# Patient Record
Sex: MALE | Race: WHITE | NOT HISPANIC OR LATINO | URBAN - METROPOLITAN AREA
[De-identification: names, ages, dates, MRNs, and addresses within clinical notes are randomized per-mention and may not be internally consistent; named-entity substitution may affect disease eponyms.]

---

## 2022-06-17 ENCOUNTER — EMERGENCY (EMERGENCY)
Facility: HOSPITAL | Age: 62
LOS: 1 days | Discharge: ANOTHER HOSPITAL | End: 2022-06-17
Attending: STUDENT IN AN ORGANIZED HEALTH CARE EDUCATION/TRAINING PROGRAM | Admitting: STUDENT IN AN ORGANIZED HEALTH CARE EDUCATION/TRAINING PROGRAM
Payer: MEDICAID

## 2022-06-17 ENCOUNTER — INPATIENT (INPATIENT)
Facility: HOSPITAL | Age: 62
LOS: 1 days | Discharge: HOME | DRG: 247 | End: 2022-06-19
Attending: INTERNAL MEDICINE | Admitting: INTERNAL MEDICINE
Payer: MEDICAID

## 2022-06-17 VITALS
HEART RATE: 82 BPM | TEMPERATURE: 98 F | OXYGEN SATURATION: 98 % | RESPIRATION RATE: 18 BRPM | DIASTOLIC BLOOD PRESSURE: 82 MMHG | SYSTOLIC BLOOD PRESSURE: 182 MMHG

## 2022-06-17 VITALS
OXYGEN SATURATION: 100 % | DIASTOLIC BLOOD PRESSURE: 88 MMHG | RESPIRATION RATE: 20 BRPM | SYSTOLIC BLOOD PRESSURE: 155 MMHG | TEMPERATURE: 99 F | HEART RATE: 88 BPM

## 2022-06-17 VITALS
OXYGEN SATURATION: 98 % | WEIGHT: 292.99 LBS | TEMPERATURE: 97 F | DIASTOLIC BLOOD PRESSURE: 82 MMHG | HEART RATE: 67 BPM | SYSTOLIC BLOOD PRESSURE: 154 MMHG | RESPIRATION RATE: 18 BRPM

## 2022-06-17 DIAGNOSIS — I21.3 ST ELEVATION (STEMI) MYOCARDIAL INFARCTION OF UNSPECIFIED SITE: ICD-10-CM

## 2022-06-17 LAB
ALBUMIN SERPL ELPH-MCNC: 4.6 G/DL — SIGNIFICANT CHANGE UP (ref 3.3–5)
ALP SERPL-CCNC: 58 U/L — SIGNIFICANT CHANGE UP (ref 40–120)
ALT FLD-CCNC: 36 U/L — SIGNIFICANT CHANGE UP (ref 12–78)
ANION GAP SERPL CALC-SCNC: 8 MMOL/L — SIGNIFICANT CHANGE UP (ref 5–17)
APTT BLD: 24.7 SEC — LOW (ref 27.5–35.5)
AST SERPL-CCNC: 28 U/L — SIGNIFICANT CHANGE UP (ref 15–37)
BASOPHILS # BLD AUTO: 0.05 K/UL — SIGNIFICANT CHANGE UP (ref 0–0.2)
BASOPHILS NFR BLD AUTO: 0.5 % — SIGNIFICANT CHANGE UP (ref 0–2)
BILIRUB SERPL-MCNC: 0.7 MG/DL — SIGNIFICANT CHANGE UP (ref 0.2–1.2)
BUN SERPL-MCNC: 30 MG/DL — HIGH (ref 7–23)
CALCIUM SERPL-MCNC: 10.6 MG/DL — HIGH (ref 8.5–10.1)
CHLORIDE SERPL-SCNC: 103 MMOL/L — SIGNIFICANT CHANGE UP (ref 96–108)
CK MB CFR SERPL CALC: 2.9 NG/ML — SIGNIFICANT CHANGE UP (ref 0–3.6)
CO2 SERPL-SCNC: 26 MMOL/L — SIGNIFICANT CHANGE UP (ref 22–31)
CREAT SERPL-MCNC: 1.9 MG/DL — HIGH (ref 0.5–1.3)
EGFR: 39 ML/MIN/1.73M2 — LOW
EOSINOPHIL # BLD AUTO: 0.02 K/UL — SIGNIFICANT CHANGE UP (ref 0–0.5)
EOSINOPHIL NFR BLD AUTO: 0.2 % — SIGNIFICANT CHANGE UP (ref 0–6)
GLUCOSE BLDC GLUCOMTR-MCNC: 116 MG/DL — HIGH (ref 70–99)
GLUCOSE BLDC GLUCOMTR-MCNC: 130 MG/DL — HIGH (ref 70–99)
GLUCOSE SERPL-MCNC: 133 MG/DL — HIGH (ref 70–99)
HCT VFR BLD CALC: 44.4 % — SIGNIFICANT CHANGE UP (ref 39–50)
HGB BLD-MCNC: 14.7 G/DL — SIGNIFICANT CHANGE UP (ref 13–17)
IMM GRANULOCYTES NFR BLD AUTO: 0.6 % — SIGNIFICANT CHANGE UP (ref 0–1.5)
INR BLD: 1.09 RATIO — SIGNIFICANT CHANGE UP (ref 0.88–1.16)
LYMPHOCYTES # BLD AUTO: 1.17 K/UL — SIGNIFICANT CHANGE UP (ref 1–3.3)
LYMPHOCYTES # BLD AUTO: 11.1 % — LOW (ref 13–44)
MCHC RBC-ENTMCNC: 29.1 PG — SIGNIFICANT CHANGE UP (ref 27–34)
MCHC RBC-ENTMCNC: 33.1 GM/DL — SIGNIFICANT CHANGE UP (ref 32–36)
MCV RBC AUTO: 87.9 FL — SIGNIFICANT CHANGE UP (ref 80–100)
MONOCYTES # BLD AUTO: 0.63 K/UL — SIGNIFICANT CHANGE UP (ref 0–0.9)
MONOCYTES NFR BLD AUTO: 6 % — SIGNIFICANT CHANGE UP (ref 2–14)
NEUTROPHILS # BLD AUTO: 8.64 K/UL — HIGH (ref 1.8–7.4)
NEUTROPHILS NFR BLD AUTO: 81.6 % — HIGH (ref 43–77)
NRBC # BLD: 0 /100 WBCS — SIGNIFICANT CHANGE UP (ref 0–0)
PLATELET # BLD AUTO: 266 K/UL — SIGNIFICANT CHANGE UP (ref 150–400)
POTASSIUM SERPL-MCNC: 5 MMOL/L — SIGNIFICANT CHANGE UP (ref 3.5–5.3)
POTASSIUM SERPL-SCNC: 5 MMOL/L — SIGNIFICANT CHANGE UP (ref 3.5–5.3)
PROT SERPL-MCNC: 8.5 G/DL — HIGH (ref 6–8.3)
PROTHROM AB SERPL-ACNC: 12.8 SEC — SIGNIFICANT CHANGE UP (ref 10.5–13.4)
RBC # BLD: 5.05 M/UL — SIGNIFICANT CHANGE UP (ref 4.2–5.8)
RBC # FLD: 12.9 % — SIGNIFICANT CHANGE UP (ref 10.3–14.5)
SARS-COV-2 RNA SPEC QL NAA+PROBE: SIGNIFICANT CHANGE UP
SODIUM SERPL-SCNC: 137 MMOL/L — SIGNIFICANT CHANGE UP (ref 135–145)
TROPONIN I, HIGH SENSITIVITY RESULT: 40 NG/L — SIGNIFICANT CHANGE UP
WBC # BLD: 10.57 K/UL — HIGH (ref 3.8–10.5)
WBC # FLD AUTO: 10.57 K/UL — HIGH (ref 3.8–10.5)

## 2022-06-17 PROCEDURE — 71045 X-RAY EXAM CHEST 1 VIEW: CPT

## 2022-06-17 PROCEDURE — 36415 COLL VENOUS BLD VENIPUNCTURE: CPT

## 2022-06-17 PROCEDURE — 92978 ENDOLUMINL IVUS OCT C 1ST: CPT | Mod: 26,LD

## 2022-06-17 PROCEDURE — 84484 ASSAY OF TROPONIN QUANT: CPT

## 2022-06-17 PROCEDURE — 92941 PRQ TRLML REVSC TOT OCCL AMI: CPT | Mod: 59,LD

## 2022-06-17 PROCEDURE — 87635 SARS-COV-2 COVID-19 AMP PRB: CPT

## 2022-06-17 PROCEDURE — 85610 PROTHROMBIN TIME: CPT

## 2022-06-17 PROCEDURE — 99291 CRITICAL CARE FIRST HOUR: CPT

## 2022-06-17 PROCEDURE — 99291 CRITICAL CARE FIRST HOUR: CPT | Mod: 25

## 2022-06-17 PROCEDURE — 93010 ELECTROCARDIOGRAM REPORT: CPT

## 2022-06-17 PROCEDURE — 99152 MOD SED SAME PHYS/QHP 5/>YRS: CPT | Mod: 59

## 2022-06-17 PROCEDURE — 82553 CREATINE MB FRACTION: CPT

## 2022-06-17 PROCEDURE — 93306 TTE W/DOPPLER COMPLETE: CPT | Mod: 26

## 2022-06-17 PROCEDURE — 85730 THROMBOPLASTIN TIME PARTIAL: CPT

## 2022-06-17 PROCEDURE — 85025 COMPLETE CBC W/AUTO DIFF WBC: CPT

## 2022-06-17 PROCEDURE — 80053 COMPREHEN METABOLIC PANEL: CPT

## 2022-06-17 PROCEDURE — 93458 L HRT ARTERY/VENTRICLE ANGIO: CPT | Mod: 26,59

## 2022-06-17 PROCEDURE — 99285 EMERGENCY DEPT VISIT HI MDM: CPT | Mod: 25

## 2022-06-17 PROCEDURE — 93005 ELECTROCARDIOGRAM TRACING: CPT

## 2022-06-17 PROCEDURE — 99223 1ST HOSP IP/OBS HIGH 75: CPT

## 2022-06-17 PROCEDURE — 71045 X-RAY EXAM CHEST 1 VIEW: CPT | Mod: 26

## 2022-06-17 RX ORDER — METOPROLOL TARTRATE 50 MG
25 TABLET ORAL
Refills: 0 | Status: DISCONTINUED | OUTPATIENT
Start: 2022-06-17 | End: 2022-06-17

## 2022-06-17 RX ORDER — HEPARIN SODIUM 5000 [USP'U]/ML
5000 INJECTION INTRAVENOUS; SUBCUTANEOUS EVERY 8 HOURS
Refills: 0 | Status: DISCONTINUED | OUTPATIENT
Start: 2022-06-18 | End: 2022-06-19

## 2022-06-17 RX ORDER — TICAGRELOR 90 MG/1
180 TABLET ORAL ONCE
Refills: 0 | Status: COMPLETED | OUTPATIENT
Start: 2022-06-17 | End: 2022-06-17

## 2022-06-17 RX ORDER — CHLORHEXIDINE GLUCONATE 213 G/1000ML
1 SOLUTION TOPICAL
Refills: 0 | Status: DISCONTINUED | OUTPATIENT
Start: 2022-06-17 | End: 2022-06-18

## 2022-06-17 RX ORDER — INSULIN LISPRO 100/ML
VIAL (ML) SUBCUTANEOUS AT BEDTIME
Refills: 0 | Status: DISCONTINUED | OUTPATIENT
Start: 2022-06-17 | End: 2022-06-19

## 2022-06-17 RX ORDER — METOPROLOL TARTRATE 50 MG
1 TABLET ORAL
Qty: 0 | Refills: 0 | DISCHARGE

## 2022-06-17 RX ORDER — INSULIN LISPRO 100/ML
VIAL (ML) SUBCUTANEOUS
Refills: 0 | Status: DISCONTINUED | OUTPATIENT
Start: 2022-06-17 | End: 2022-06-19

## 2022-06-17 RX ORDER — HEPARIN SODIUM 5000 [USP'U]/ML
INJECTION INTRAVENOUS; SUBCUTANEOUS
Qty: 25000 | Refills: 0 | Status: DISCONTINUED | OUTPATIENT
Start: 2022-06-17 | End: 2022-06-21

## 2022-06-17 RX ORDER — SODIUM CHLORIDE 9 MG/ML
1000 INJECTION INTRAMUSCULAR; INTRAVENOUS; SUBCUTANEOUS ONCE
Refills: 0 | Status: COMPLETED | OUTPATIENT
Start: 2022-06-17 | End: 2022-06-17

## 2022-06-17 RX ORDER — TICAGRELOR 90 MG/1
90 TABLET ORAL EVERY 12 HOURS
Refills: 0 | Status: DISCONTINUED | OUTPATIENT
Start: 2022-06-17 | End: 2022-06-19

## 2022-06-17 RX ORDER — METOPROLOL TARTRATE 50 MG
50 TABLET ORAL
Refills: 0 | Status: DISCONTINUED | OUTPATIENT
Start: 2022-06-18 | End: 2022-06-19

## 2022-06-17 RX ORDER — SODIUM CHLORIDE 9 MG/ML
1000 INJECTION, SOLUTION INTRAVENOUS
Refills: 0 | Status: DISCONTINUED | OUTPATIENT
Start: 2022-06-17 | End: 2022-06-18

## 2022-06-17 RX ORDER — METFORMIN HYDROCHLORIDE 850 MG/1
1 TABLET ORAL
Qty: 0 | Refills: 0 | DISCHARGE

## 2022-06-17 RX ORDER — HEPARIN SODIUM 5000 [USP'U]/ML
4000 INJECTION INTRAVENOUS; SUBCUTANEOUS ONCE
Refills: 0 | Status: COMPLETED | OUTPATIENT
Start: 2022-06-17 | End: 2022-06-17

## 2022-06-17 RX ORDER — ASPIRIN/CALCIUM CARB/MAGNESIUM 324 MG
162 TABLET ORAL ONCE
Refills: 0 | Status: COMPLETED | OUTPATIENT
Start: 2022-06-17 | End: 2022-06-17

## 2022-06-17 RX ORDER — HEPARIN SODIUM 5000 [USP'U]/ML
4000 INJECTION INTRAVENOUS; SUBCUTANEOUS EVERY 6 HOURS
Refills: 0 | Status: DISCONTINUED | OUTPATIENT
Start: 2022-06-17 | End: 2022-06-21

## 2022-06-17 RX ORDER — ASPIRIN/CALCIUM CARB/MAGNESIUM 324 MG
81 TABLET ORAL DAILY
Refills: 0 | Status: DISCONTINUED | OUTPATIENT
Start: 2022-06-17 | End: 2022-06-19

## 2022-06-17 RX ORDER — INFLUENZA VIRUS VACCINE 15; 15; 15; 15 UG/.5ML; UG/.5ML; UG/.5ML; UG/.5ML
0.5 SUSPENSION INTRAMUSCULAR ONCE
Refills: 0 | Status: COMPLETED | OUTPATIENT
Start: 2022-06-17 | End: 2022-06-17

## 2022-06-17 RX ORDER — ATORVASTATIN CALCIUM 80 MG/1
80 TABLET, FILM COATED ORAL AT BEDTIME
Refills: 0 | Status: DISCONTINUED | OUTPATIENT
Start: 2022-06-17 | End: 2022-06-19

## 2022-06-17 RX ORDER — METOPROLOL TARTRATE 50 MG
25 TABLET ORAL ONCE
Refills: 0 | Status: COMPLETED | OUTPATIENT
Start: 2022-06-17 | End: 2022-06-17

## 2022-06-17 RX ORDER — ACETAMINOPHEN 500 MG
650 TABLET ORAL ONCE
Refills: 0 | Status: COMPLETED | OUTPATIENT
Start: 2022-06-17 | End: 2022-06-17

## 2022-06-17 RX ADMIN — TICAGRELOR 180 MILLIGRAM(S): 90 TABLET ORAL at 15:12

## 2022-06-17 RX ADMIN — SODIUM CHLORIDE 1000 MILLILITER(S): 9 INJECTION INTRAMUSCULAR; INTRAVENOUS; SUBCUTANEOUS at 15:00

## 2022-06-17 RX ADMIN — Medication 25 MILLIGRAM(S): at 22:30

## 2022-06-17 RX ADMIN — ATORVASTATIN CALCIUM 80 MILLIGRAM(S): 80 TABLET, FILM COATED ORAL at 21:33

## 2022-06-17 RX ADMIN — Medication 162 MILLIGRAM(S): at 15:03

## 2022-06-17 RX ADMIN — HEPARIN SODIUM 4000 UNIT(S): 5000 INJECTION INTRAVENOUS; SUBCUTANEOUS at 15:20

## 2022-06-17 RX ADMIN — SODIUM CHLORIDE 75 MILLILITER(S): 9 INJECTION, SOLUTION INTRAVENOUS at 21:39

## 2022-06-17 RX ADMIN — Medication 25 MILLIGRAM(S): at 18:54

## 2022-06-17 NOTE — CONSULT NOTE ADULT - NS ATTEND AMEND GEN_ALL_CORE FT
Chart reviewed    Patient seen and examined    Agree with plan as outlined above    62-year-old male past medical history of diabetes hypertension presents with back pain and jaw pain, found to have  inferior and precordial ST elevation, being transferred for [facility].     STEMI  - Patient presented to ER with c/o back pain and jaw pain, found to have inferior and precordial ST elevation  - Dr Ferguson at bedside. Called [facility], arranged for a transfer for left heart cath. Spoke to Dr Shaver.   - S/p , Brilinta 180 mg and heparin loaded in ED  - Will continue to follow at [facility]

## 2022-06-17 NOTE — ED PROVIDER NOTE - OBJECTIVE STATEMENT
Pt is a 63 yo male with pmhx of HTN DM here for lightheadedness and back pain. Pt states he is  and was working outside in heat and started to feel lightheaded get neck pain and pain in middle of back no sob no chest pain numbness tingling weakness. Pt states he drank enough water today.

## 2022-06-17 NOTE — H&P ADULT - NSHPPHYSICALEXAM_GEN_ALL_CORE
General: Obese middle-aged man, NAD  Neurology/Psychiatric: A&Ox3, nonfocal, LEBLANC x 4. Mood and affect appropriate for situation  Respiratory: Breath sounds CTA in all lung fields b/l. No rhonchi, rales, wheezes  CV: Sinus w/ PVCs/AIVR 85, S1, S2. No murmurs, rubs or gallops. No JVD  Abdominal: Soft, non-tender, non-distended. normoactive BS. No CVAT  Extremities: No peripheral edema, 2+ peripheral pulses in UE/LE b/l, R medial malleolus protruding chronically. R radial band intact no bleeding/hematoma   Skin: No rashes, lesions, ulcers or discoloration

## 2022-06-17 NOTE — CONSULT NOTE ADULT - NS ATTEND OPT1 GEN_ALL_CORE
No I attest my time as attending is greater than 50% of the total combined time spent on qualifying patient care activities by the PA/NP and attending.

## 2022-06-17 NOTE — H&P ADULT - NSHPREVIEWOFSYSTEMS_GEN_ALL_CORE
REVIEW OF SYSTEMS:    GENERAL: No fevers, chills, malaise, weakness, recent travel, recent sick contacts    HEENT: No HA, dizziness, vertigo, visual changes, nasal/sinus congestion, ear pain, auditory changes, sore throat, dysphagia  NECK: No pain or stiffness  RESPIRATORY: No difficulty breathing, cough, wheezing, hemoptysis  CARDIOVASCULAR: See HPI  GASTROINTESTINAL: No abd/epigastric pain. No N/V/D, constipation, melena or hematochezia.  GENITOURINARY: No dysuria, frequency/incontinence or hematuria  NEUROLOGIC: No numbness, tingling, paresthesias, local/generalized weakness  SKIN: No rashes, lesions ,itching, ulcers, discoloration REVIEW OF SYSTEMS:    GENERAL: No fevers, chills, weakness  HEENT: No HA, dizziness  RESPIRATORY: No difficulty breathing, cough, wheezing  CARDIOVASCULAR: See HPI  GASTROINTESTINAL: No abd/epigastric pain. No N/V/D  NEUROLOGIC: No numbness, tingling, paresthesias, local/generalized weakness

## 2022-06-17 NOTE — PATIENT PROFILE ADULT - FALL HARM RISK - RISK INTERVENTIONS
Monitor for mental status changes/Reinforce activity limits and safety measures with patient and family/Bed in lowest position, wheels locked, appropriate side rails in place/Call bell, personal items and telephone in reach/[place] to call system/Physically safe environment - no spills, clutter or unnecessary equipment/Purposeful Proactive Rounding/Room/bathroom lighting operational, light cord in reach

## 2022-06-17 NOTE — H&P ADULT - ASSESSMENT
62 yr old male w/ PMHx of HTN, DM2, GABRIELE presents as transfer from [facility] ED as AWSTEMI s/p PCI to LAD 6/17.     NEURO:   A&Ox3 no issues    RESPIRATORY:   Hx of GABRIELE   - s/p outpatient w/u ~10yrs ago  - Tonsillectomy "resolved issue", no CPAP @home  - spO2 90s on RA     CV:   AWSTEMI  - s/p LHC 6/17 w/ NIC x1 pLAD 90%   - ASA/Brilinta for DAPT, Lipitor 80 for HI Statin   - Lopressor 25 BID for BB   - pt w/ persistent AIVR  62 yr old male w/ PMHx of HTN, DM2, GABRIELE presents as transfer from [facility] ED as AWSTEMI s/p PCI to LAD 6/17.     NEURO:   A&Ox3 no issues    RESPIRATORY:   Hx of GABRIELE   - s/p outpatient w/u ~10yrs ago  - Tonsillectomy "resolved issue", no CPAP @home  - spO2 90s on RA    CV:   AWSTEMI  - s/p C 6/17 w/ NIC x1 pLAD 90%   - ASA/Brilinta for DAPT, Lipitor 80 for HI Statin   - pt w/ persistent AIVR and runs NSVT, Lopressor 25 BID for BB. Home dose 50mg BID, escalate as tolerated.  - Hold ARB until renal function improved  - Trend CE to peak  - remains chest pain free.  - cath arterial access R radial artery, radial band removed per protocol, monitor site    Renal:  DMITRY  - SCr 1.9, unknown baseline, patient reports no h/o renal disease.  - LR 75cc/hr x5 hrs  - Trend BUN/SCr. Strict I/Os  - Keep K 4-4.5, mg > 2  - avoid nephrotoxins. Hold ARB as above.    GI:  No issues  - DASH/CC diet    Endo:  DM2  - f/up A1c. Hold home metformin, start ISS trend finger sticks  - f/up TSH    Heme:  DVT ppx  - SCDs for now, SQH if radial artery access site stable [id number] after band removal.    ID:  No issues  - trend fever curve, WBC. No signs/symptoms to warrant infectious w/up. Monitor off abx. 62 yr old male w/ PMHx of HTN, DM2, GABRIELE presents as transfer from [facility] ED as AWSTEMI s/p PCI to LAD 6/17.     NEURO:   A&Ox3 no issues    RESPIRATORY:   Hx of GABRILEE   - s/p outpatient w/u ~10yrs ago  - Tonsillectomy "resolved issue", no CPAP @home  - spO2 90s on RA    CV:   AWSTEMI  - s/p C 6/17 w/ NIC x1 pLAD 90%. EDP 25, EF on vgram 40%  - ASA/Brilinta for DAPT, Lipitor 80 for HI Statin   - pt w/ persistent AIVR and runs NSVT, Lopressor 25 BID for BB. Home dose 50mg BID, escalate as tolerated.  - Hold ARB until renal function improved  - f/up TTE  - Trend CE to peak  - remains chest pain free.  - cath arterial access R radial artery, radial band removed per protocol, monitor site    Renal:  DMITRY  - SCr 1.9, unknown baseline, patient reports no h/o renal disease.  - LR 75cc/hr x5 hrs  - Trend BUN/SCr. Strict I/Os  - Keep K 4-4.5, mg > 2  - avoid nephrotoxins. Hold ARB as above.    GI:  No issues  - DASH/CC diet    Endo:  DM2  - f/up A1c. Hold home metformin, start ISS trend finger sticks  - f/up TSH    Heme:  DVT ppx  - SCDs for now, SQH if radial artery access site stable [id number] after band removal.    ID:  No issues  - trend fever curve, WBC. No signs/symptoms to warrant infectious w/up. Monitor off abx. 62 yr old male w/ PMHx of HTN, DM2, GABRIELE presents as transfer from [facility] ED as AWSTEMI s/p PCI to LAD 6/17.     NEURO:   A&Ox3 no issues    RESPIRATORY:   Hx of GABRIELE   - s/p outpatient w/u ~10yrs ago  - Tonsillectomy "resolved issue", no CPAP @home  - spO2 90s on RA    CV:   AWSTEMI  - s/p C 6/17 w/ NIC x1 pLAD 90%. EDP 25, EF on vgram 40%  - ASA/Brilinta for DAPT, Lipitor 80 for HI Statin   - pt w/ persistent AIVR and runs NSVT, Lopressor 25 BID for BB. Home dose 50mg BID, escalate as tolerated.  - Hold ARB until renal function improved  - f/up TTE  - Trend CE to peak  - remains chest pain free.  - cath arterial access R radial artery, radial band removed per protocol, monitor site    Renal:  DMITRY  - SCr 1.9, unknown baseline, patient reports no h/o renal disease.  - LR 75cc/hr x5 hrs  - Trend BUN/SCr. Strict I/Os  - Keep K 4-4.5, mg > 2  - avoid nephrotoxins. Hold ARB as above.    GI:  No issues  - DASH/CC diet    Endo:  DM2  - f/up A1c. Hold home metformin, start ISS trend finger sticks  - f/up TSH    Heme:  DVT ppx  - SQH  - trend CBC    ID:  No issues  - trend fever curve, WBC. No signs/symptoms to warrant infectious w/up. Monitor off abx.

## 2022-06-17 NOTE — CONSULT NOTE ADULT - ASSESSMENT
62-year-old male past medical history of diabetes hypertension presents with back pain and jaw pain, found to have  inferior and precordial ST elevation, being transferred for C.     STEMI  - Patient presented to ER with c/o back pain and jaw pain, found to have inferior and precordial ST elevation  - Dr Ferguson at bedside. Called [facility], arranged for a transfer for left heart cath. Spoke to Dr Shaver.   - S/p , Brilinta 180 mg and heparin loaded in ED  - Will continue to follow at [facility]    Anjum Bowen, MS FNP, AGAP  Nurse Practitioner- Cardiology   Spectra #[id number] /[phone]

## 2022-06-17 NOTE — H&P ADULT - NSHPLABSRESULTS_GEN_ALL_CORE
06-17    137  |  103  |  30<H>  ----------------------------<  133<H>  5.0   |  26  |  1.90<H>    Ca    10.6<H>      17 Jun 2022 15:02    TPro  8.5<H>  /  Alb  4.6  /  TBili  0.7  /  DBili  x   /  AST  28  /  ALT  36  /  AlkPhos  58  06-17                          14.7   10.57 )-----------( 266      ( 17 Jun 2022 15:02 )             44.4   CARDIAC MARKERS ( 17 Jun 2022 15:02 )  x     / x     / x     / x     / 2.9 ng/mL

## 2022-06-17 NOTE — H&P ADULT - NSICDXPASTMEDICALHX_GEN_ALL_CORE_FT
PAST MEDICAL HISTORY:  DM (diabetes mellitus)     HTN (hypertension)     GABRIELE (obstructive sleep apnea)

## 2022-06-17 NOTE — ED ADULT NURSE NOTE - OBJECTIVE STATEMENT
Patient  is a 61 yo male with pmhx of HTN DM here for lightheadedness and back pain. Pt states he is  and was working outside in heat and started to feel lightheaded get neck pain and pain in middle of back no sob no chest pain numbness tingling weakness. Pt states he drank enough water today.

## 2022-06-17 NOTE — CONSULT NOTE ADULT - SUBJECTIVE AND OBJECTIVE BOX
[facility] - Smith Cruz Grossman, [place], Richard Diaz, Maria Zaragoza  Office Number: [phone]    Initial Consult Note  CHIEF COMPLAINT: Patient is a 62y old  Male who presents with a chief complaint of dizziness, neck pain, back pain  HPI: 62-year-old male past medical history of diabetes hypertension presents with back pain and jaw pain. Patient was at work, works as a alves, developed dizziness and lightheadedness wasn’t feeling good. Reports he started having pain on his neck and mid back area, thought it was because of the heat and dehydration, had a couple Gatorade, discomfort persisted, and decided to come to the ER.     In ER, T(F): 96.8, HR: 67, BP: 154/82, RR: 18, SpO2: 98%. patient was found to have anterior wall ST elevation, patient loaded with aspirin 324 mg times one Brilinta 180 mg x 1 and started on a heparin drip after 4000 units IV push. Patient at this time reports improvement in his symptoms and reports 5/10 discomfort. Called [facility], arranged for a transfer for left heart cath. Spoke to Dr Shaver.     Allergies  cats (Unknown)  Ceftin (Unknown)    PAST MEDICAL & SURGICAL HISTORY:    MEDICATIONS  (STANDING):  heparin   Injectable 4000 Unit(s) IV Push once  heparin  Infusion.  Unit(s)/Hr (10 mL/Hr) IV Continuous <Continuous>    MEDICATIONS  (PRN):  heparin   Injectable 4000 Unit(s) IV Push every 6 hours PRN For aPTT less than 40    FAMILY HISTORY:  CAD: Mother, Father     SOCIAL HISTORY: No active tobacco, ethanol, or drug abuse.    REVIEW OF SYSTEMS   All other review of systems is negative unless indicated above.    VITAL SIGNS:   Vital Signs Last 24 Hrs  T(C): 36 (17 Jun 2022 13:40), Max: 36 (17 Jun 2022 13:40)  T(F): 96.8 (17 Jun 2022 13:40), Max: 96.8 (17 Jun 2022 13:40)  HR: 67 (17 Jun 2022 13:40) (67 - 67)  BP: 154/82 (17 Jun 2022 13:40) (154/82 - 154/82)  BP(mean): --  RR: 18 (17 Jun 2022 13:40) (18 - 18)  SpO2: 98% (17 Jun 2022 13:40) (98% - 98%)    Physical Exam:  Constitutional: NAD, awake and alert, Obese   HEENT: Moist Mucous Membranes, Anicteric  Pulmonary: Non-labored, breath sounds are clear bilaterally, No wheezing, rales or rhonchi  Cardiovascular: Regular, S1 and S2, No murmurs, No rubs, gallops or clicks  Gastrointestinal: Bowel Sounds present, soft, nontender.   Lymph: No peripheral edema. No lymphadenopathy.  Skin: No visible rashes or ulcers.  Psych:  Mood & affect appropriate    I&O's Summary      LABS: Pending

## 2022-06-17 NOTE — ED PROVIDER NOTE - CLINICAL SUMMARY MEDICAL DECISION MAKING FREE TEXT BOX
Pt is a 61 yo male with htn dm here with neck pain dizziness will get labs ekg cxr Pt is a 61 yo male with htn dm here with neck pain dizzinessnad mid back pain, concner for acs will get labs ekg cxr

## 2022-06-17 NOTE — CHART NOTE - NSCHARTNOTEFT_GEN_A_CORE
Removal of Radial Band    Pulses in the right upper extremity are palpable. The patient was placed in the supine position. The insertion site was identified and the band deflated per protocol. The radial band was removed slowly. Direct pressure was applied for -- not applicable.      Monitoring of the (right/left) wrists and both upper extremities including neuro-vascular checks and vital signs every 15 minutes x 4.    Complications: None    Comments: No bleeding or hematoma formation, palpable radial pulse, dressing applied.

## 2022-06-17 NOTE — PROGRESS NOTE ADULT - SUBJECTIVE AND OBJECTIVE BOX
RAYMOND HODGES  MRN-[id number]  Patient is a 62y old  Male who presents with a chief complaint of Anterior Wall STEMI (17 Jun 2022 17:51)    HPI:  62 yr old male w/ PMHx of HTN, DM2, GABRIELE presents to ED at [facility] w/ CP dizziness, EKG showing KEV in anterior leads V2/V3. Pt transferred to [facility] for cath s/p NIC to pLAD 90%. Pt arrived to CICU hemodynamically stable with no formal complaints having significant runs of AIVR. Pt denies palpitations, CP, SOB, dizziness, HA, abd pain, N/V.  (17 Jun 2022 17:51)      Hospital Course:  6/17 Transferred to CCU for further management     24 HOUR EVENTS:    REVIEW OF SYSTEMS:    CONSTITUTIONAL: No weakness, fevers or chills  EYES/ENT: No visual changes;  No vertigo or throat pain   NECK: No pain or stiffness  RESPIRATORY: No cough, wheezing, hemoptysis; No shortness of breath  CARDIOVASCULAR: No chest pain or palpitations  GASTROINTESTINAL: No abdominal or epigastric pain. No nausea, vomiting, or hematemesis; No diarrhea or constipation. No melena or hematochezia.  GENITOURINARY: No dysuria, frequency or hematuria  NEUROLOGICAL: No numbness or weakness  SKIN: No itching, rashes      ICU Vital Signs Last 24 Hrs  T(C): 37 (17 Jun 2022 19:00), Max: 37.1 (17 Jun 2022 16:55)  T(F): 98.6 (17 Jun 2022 19:00), Max: 98.7 (17 Jun 2022 16:55)  HR: 90 (17 Jun 2022 20:30) (67 - 94)  BP: 120/84 (17 Jun 2022 20:00) (120/84 - 185/89)  BP(mean): 99 (17 Jun 2022 20:00) (99 - 123)  ABP: --  ABP(mean): --  RR: 20 (17 Jun 2022 20:30) (16 - 39)  SpO2: 96% (17 Jun 2022 20:30) (96% - 100%)      CVP(mm Hg): --  CO: --  CI: --  PA: --  PA(mean): --  PA(direct): --  PCWP: --  LA: --  RA: --  SVR: --  SVRI: --  PVR: --  PVRI: --  I&O's Summary    17 Jun 2022 07:01  -  17 Jun 2022 20:59  --------------------------------------------------------  IN: 0 mL / OUT: 600 mL / NET: -600 mL        CAPILLARY BLOOD GLUCOSE    CAPILLARY BLOOD GLUCOSE      POCT Blood Glucose.: 116 mg/dL (17 Jun 2022 16:57)      PHYSICAL EXAM:  General: Obese middle-aged man, NAD  Neurology/Psychiatric: A&Ox3, nonfocal, LEBLANC x 4. Mood and affect appropriate for situation  Respiratory: Breath sounds CTA in all lung fields b/l. No rhonchi, rales, wheezes  CV: Sinus w/ PVCs/AIVR 85, S1, S2. No murmurs, rubs or gallops. No JVD  Abdominal: Soft, non-tender, non-distended. normoactive BS. No CVAT  Extremities: No peripheral edema, 2+ peripheral pulses in UE/LE b/l, R medial malleolus protruding chronically. R radial band intact no bleeding/hematoma   Skin: No rashes, lesions, ulcers or discoloration    ============================I/O===========================   I&O's Detail    17 Jun 2022 07:01  -  17 Jun 2022 20:59  --------------------------------------------------------  IN:  Total IN: 0 mL    OUT:    Voided (mL): 600 mL  Total OUT: 600 mL    Total NET: -600 mL        ============================ LABS =========================                        14.7   10.57 )-----------( 266      ( 17 Jun 2022 15:02 )             44.4     06-17    137  |  103  |  30<H>  ----------------------------<  133<H>  5.0   |  26  |  1.90<H>    Ca    10.6<H>      17 Jun 2022 15:02    TPro  8.5<H>  /  Alb  4.6  /  TBili  0.7  /  DBili  x   /  AST  28  /  ALT  36  /  AlkPhos  58  06-17      CKMB Units: 2.9 ng/mL (06-17-22 @ 15:02)            LIVER FUNCTIONS - ( 17 Jun 2022 15:02 )  Alb: 4.6 g/dL / Pro: 8.5 g/dL / ALK PHOS: 58 U/L / ALT: 36 U/L / AST: 28 U/L / GGT: x           PT/INR - ( 17 Jun 2022 15:02 )   PT: 12.8 sec;   INR: 1.09 ratio         PTT - ( 17 Jun 2022 15:02 )  PTT:24.7 sec        ======================Micro/Rad/Cardio=================  Telemtry: Reviewed   EKG: Reviewed  CXR: Reviewed  Culture: Reviewed   Echo:   Cath:   ======================================================  PAST MEDICAL & SURGICAL HISTORY:  HTN (hypertension)      DM (diabetes mellitus)      GABRIELE (obstructive sleep apnea)        ====================ASSESSMENT ==============  62 yr old male w/ PMHx of HTN, DM2, GABRIELE presents as transfer from [facility] ED as AWSTEMI s/p PCI to LAD 6/17.           Plan:  ====================== NEUROLOGY=====================  - A&Ox3 no issues    ==================== RESPIRATORY======================  Hx of GABRIELE   - s/p outpatient w/u ~10yrs ago  - Tonsillectomy "resolved issue", no CPAP @home  - spO2 90s on RA       ====================CARDIOVASCULAR==================  AWSTEMI  - s/p LHC 6/17 w/ NIC x1 pLAD 90%   - ASA/Brilinta for DAPT, Lipitor 80 for HI Statin   - Lopressor 25 BID for BB   - pt w/ persistent AIVR     atorvastatin 80 milliGRAM(s) Oral at bedtime  aspirin enteric coated 81 milliGRAM(s) Oral daily  ticagrelor 90 milliGRAM(s) Oral every 12 hours  metoprolol tartrate 25 milliGRAM(s) Oral two times a day    ===================HEMATOLOGIC/ONC ===================  - Monitor H/H & PLTs      ===================== RENAL =========================  -Continue monitoring urine output    ==================== GASTROINTESTINAL===================  - Tolerating PO DASH/TLC diet.     lactated ringers. 1000 milliLiter(s) (75 mL/Hr) IV Continuous <Continuous>    =======================    ENDOCRINE  =====================  - Hx of DMT2, glycemic control with Admelog sliding scale and Glucagon PRN.   Monitor blood glucose levels.     insulin lispro (ADMELOG) corrective regimen sliding scale   SubCutaneous three times a day before meals  insulin lispro (ADMELOG) corrective regimen sliding scale   SubCutaneous at bedtime    ========================INFECTIOUS DISEASE================  - Afebrile, white blood cells within normal limits.  - Monitor temperature and trend white blood cells.       Patient requires continuous monitoring with bedside rhythm monitoring, pulse ox monitoring, and intermittent blood gas analysis. Care plan discussed with ICU care team. Patient remained critical and at risk for life threatening decompensation.  Patient seen, examined and plan discussed with CCU team during rounds.     I have personally provided ____ minutes of critical care time excluding time spent on separate procedures, in addition to initial critical care time provided by the CICU Attending, Dr. Belcher.    By signing my name below, I, Ana María Bass, attest that this documentation has been prepared under the direction and in the presence of LOUISE Dickens   Electronically signed: Ree Inman, 06-17-22 @ 20:59    I, LOUISE Dickens, personally performed the services described in this documentation. all medical record entries made by the scribe were at my direction and in my presence. I have reviewed the chart and agree that the record reflects my personal performance and is accurate and complete  Electronically signed: LOUISE Dickens        RAYMOND HODGES  MRN-[id number]  Patient is a 62y old  Male who presents with a chief complaint of Anterior Wall STEMI (17 Jun 2022 17:51)    HPI:  62 yr old male w/ PMHx of HTN, DM2, GABRIELE presents to ED at [facility] w/ CP dizziness, EKG showing KEV in anterior leads V2/V3. Pt transferred to [facility] for cath s/p NIC to pLAD 90%. Pt arrived to CICU hemodynamically stable with no formal complaints having significant runs of AIVR. Pt denies palpitations, CP, SOB, dizziness, HA, abd pain, N/V.  (17 Jun 2022 17:51)      Hospital Course:  6/17 Transferred to CCU for further management     24 HOUR EVENTS:    REVIEW OF SYSTEMS:    CONSTITUTIONAL: No weakness, fevers or chills  EYES/ENT: No visual changes;  No vertigo or throat pain   NECK: No pain or stiffness  RESPIRATORY: No cough, wheezing, hemoptysis; No shortness of breath  CARDIOVASCULAR: No chest pain or palpitations  GASTROINTESTINAL: No abdominal or epigastric pain. No nausea, vomiting, or hematemesis; No diarrhea or constipation. No melena or hematochezia.  GENITOURINARY: No dysuria, frequency or hematuria  NEUROLOGICAL: No numbness or weakness  SKIN: No itching, rashes      ICU Vital Signs Last 24 Hrs  T(C): 37 (17 Jun 2022 19:00), Max: 37.1 (17 Jun 2022 16:55)  T(F): 98.6 (17 Jun 2022 19:00), Max: 98.7 (17 Jun 2022 16:55)  HR: 90 (17 Jun 2022 20:30) (67 - 94)  BP: 120/84 (17 Jun 2022 20:00) (120/84 - 185/89)  BP(mean): 99 (17 Jun 2022 20:00) (99 - 123)  ABP: --  ABP(mean): --  RR: 20 (17 Jun 2022 20:30) (16 - 39)  SpO2: 96% (17 Jun 2022 20:30) (96% - 100%)      CVP(mm Hg): --  CO: --  CI: --  PA: --  PA(mean): --  PA(direct): --  PCWP: --  LA: --  RA: --  SVR: --  SVRI: --  PVR: --  PVRI: --  I&O's Summary    17 Jun 2022 07:01  -  17 Jun 2022 20:59  --------------------------------------------------------  IN: 0 mL / OUT: 600 mL / NET: -600 mL        CAPILLARY BLOOD GLUCOSE    CAPILLARY BLOOD GLUCOSE      POCT Blood Glucose.: 116 mg/dL (17 Jun 2022 16:57)      PHYSICAL EXAM:  General: Obese middle-aged man, NAD  Neurology/Psychiatric: A&Ox3, nonfocal, LEBLANC x 4. Mood and affect appropriate for situation  Respiratory: Breath sounds CTA in all lung fields b/l. No rhonchi, rales, wheezes  CV: Sinus w/ PVCs/AIVR 85, S1, S2. No murmurs, rubs or gallops. No JVD  Abdominal: Soft, non-tender, non-distended. normoactive BS. No CVAT  Extremities: No peripheral edema, 2+ peripheral pulses in UE/LE b/l, R medial malleolus protruding chronically. R radial band intact no bleeding/hematoma   Skin: No rashes, lesions, ulcers or discoloration    ============================I/O===========================   I&O's Detail    17 Jun 2022 07:01  -  17 Jun 2022 20:59  --------------------------------------------------------  IN:  Total IN: 0 mL    OUT:    Voided (mL): 600 mL  Total OUT: 600 mL    Total NET: -600 mL        ============================ LABS =========================                        14.7   10.57 )-----------( 266      ( 17 Jun 2022 15:02 )             44.4     06-17    137  |  103  |  30<H>  ----------------------------<  133<H>  5.0   |  26  |  1.90<H>    Ca    10.6<H>      17 Jun 2022 15:02    TPro  8.5<H>  /  Alb  4.6  /  TBili  0.7  /  DBili  x   /  AST  28  /  ALT  36  /  AlkPhos  58  06-17      CKMB Units: 2.9 ng/mL (06-17-22 @ 15:02)            LIVER FUNCTIONS - ( 17 Jun 2022 15:02 )  Alb: 4.6 g/dL / Pro: 8.5 g/dL / ALK PHOS: 58 U/L / ALT: 36 U/L / AST: 28 U/L / GGT: x           PT/INR - ( 17 Jun 2022 15:02 )   PT: 12.8 sec;   INR: 1.09 ratio         PTT - ( 17 Jun 2022 15:02 )  PTT:24.7 sec        ======================Micro/Rad/Cardio=================  Telemtry: Reviewed   EKG: Reviewed  CXR: Reviewed  Culture: Reviewed   Echo:   Cath:   ======================================================  PAST MEDICAL & SURGICAL HISTORY:  HTN (hypertension)      DM (diabetes mellitus)      GABRIELE (obstructive sleep apnea)        ====================ASSESSMENT ==============  62 yr old male w/ PMHx of HTN, DM2, GABRIELE presents [facility] transfer [facility] hospitals ED as AWSTEMI s/p PCI to LAD 6/17.     PLAN:     NEURO:   A&Ox3 no issues    RESPIRATORY:   Hx of GABRIELE   - s/p outpatient w/u ~10yrs ago  - Tonsillectomy "resolved issue", no CPAP @home  - spO2 90s on RA    CV:   AWSTEMI  - s/p LHC 6/17 w/ NIC x1 pLAD 90%. EDP 25, EF on vgram 40%  - ASA/Brilinta for DAPT, Lipitor 80 for HI Statin   - pt w/ persistent AIVR and runs NSVT, Lopressor 25 BID for BB. Home dose 50mg BID, escalate as tolerated.  - Hold ARB until renal function improved  - f/up TTE  - Trend CE to peak  - remains chest pain free.  - cath arterial access R radial artery, radial band removed per protocol, monitor site    Renal:  DMITRY  - SCr 1.9, unknown baseline, patient reports no h/o renal disease.  - LR 75cc/hr x5 hrs  - Trend BUN/SCr. Strict I/Os  - Keep K 4-4.5, mg > 2  - avoid nephrotoxins. Hold ARB as above.    GI:  No issues  - DASH/CC diet    Endo:  DM2  - f/up A1c. Hold home metformin, start ISS trend finger sticks  - f/up TSH    Heme:  DVT ppx  - SQH  - trend CBC    ID:  No issues  - trend fever curve, WBC. No signs/symptoms to warrant infectious w/up. Monitor off abx.      Patient requires continuous monitoring with bedside rhythm monitoring, pulse ox monitoring, and intermittent blood gas analysis. Care plan discussed with ICU care team. Patient remained critical and at risk for life threatening decompensation.  Patient seen, examined and plan discussed with CCU team during rounds.     I have personally provided 35 minutes of critical care time excluding time spent on separate procedures, in addition to initial critical care time provided by the CICU Attending, Dr. Belcher.    By signing my name below, I, Ana María Bass, attest that this documentation has been prepared under the direction and in the presence of LOUISE Dickens   Electronically signed: Ree Inman, 06-17-22 @ 20:59    I, LOUISE Dickens, personally performed the services described in this documentation. all medical record entries made by the zoeyibjayna were at my direction and in my presence. I have reviewed the chart and agree that the record reflects my personal performance and is accurate and complete  Electronically signed: LOUISE Dickens        RAYMOND HODGES  MRN-[id number]  Patient is a 62y old  Male who presents with a chief complaint of Anterior Wall STEMI (17 Jun 2022 17:51)    HPI:  62 yr old male w/ PMHx of HTN, DM2, GABRIELE presents to ED at [facility] w/ CP dizziness, EKG showing KEV in anterior leads V2/V3. Pt transferred to [facility] for cath s/p NIC to pLAD 90%. Pt arrived to CICU hemodynamically stable with no formal complaints having significant runs of AIVR. Pt denies palpitations, CP, SOB, dizziness, HA, abd pain, N/V.  (17 Jun 2022 17:51)      Hospital Course:  6/17 AWSTEMI s/p LHC with PCI to LAD    24 HOUR EVENTS:  - LHC: 90% stenosis LAD s/p NIC x1 via RRA  - persistent AIVR, initiated and escalated BB    REVIEW OF SYSTEMS:  GENERAL: No fevers, chills, weakness  HEENT: No HA, dizziness  RESPIRATORY: No difficulty breathing, cough, wheezing  CARDIOVASCULAR: See HPI  GASTROINTESTINAL: No abd/epigastric pain. No N/V/D  NEUROLOGIC: No numbness, tingling, paresthesias, local/generalized weakness      ICU Vital Signs Last 24 Hrs  T(C): 37 (17 Jun 2022 19:00), Max: 37.1 (17 Jun 2022 16:55)  T(F): 98.6 (17 Jun 2022 19:00), Max: 98.7 (17 Jun 2022 16:55)  HR: 90 (17 Jun 2022 20:30) (67 - 94)  BP: 120/84 (17 Jun 2022 20:00) (120/84 - 185/89)  BP(mean): 99 (17 Jun 2022 20:00) (99 - 123)  RR: 20 (17 Jun 2022 20:30) (16 - 39)  SpO2: 96% (17 Jun 2022 20:30) (96% - 100%)      I&O's Summary    17 Jun 2022 07:01  -  17 Jun 2022 20:59  --------------------------------------------------------  IN: 0 mL / OUT: 600 mL / NET: -600 mL        CAPILLARY BLOOD GLUCOSE    CAPILLARY BLOOD GLUCOSE      POCT Blood Glucose.: 116 mg/dL (17 Jun 2022 16:57)      PHYSICAL EXAM:  General: Obese middle-aged man, NAD  Neurology/Psychiatric: A&Ox3, nonfocal, LEBLANC x 4. Mood and affect appropriate for situation  Respiratory: Breath sounds CTA in all lung fields b/l. No rhonchi, rales, wheezes  CV: Sinus w/ PVCs/AIVR 85, S1, S2. No murmurs, rubs or gallops. No JVD  Abdominal: Soft, non-tender, non-distended. normoactive BS. No CVAT  Extremities: No peripheral edema, 2+ peripheral pulses in UE/LE b/l, R medial malleolus protruding chronically. R radial band intact no bleeding/hematoma   Skin: No rashes, lesions, ulcers or discoloration    ============================I/O===========================   I&O's Detail    17 Jun 2022 07:01  -  17 Jun 2022 20:59  --------------------------------------------------------  IN:  Total IN: 0 mL    OUT:    Voided (mL): 600 mL  Total OUT: 600 mL    Total NET: -600 mL        ============================ LABS =========================                        14.7   10.57 )-----------( 266      ( 17 Jun 2022 15:02 )             44.4     06-17    137  |  103  |  30<H>  ----------------------------<  133<H>  5.0   |  26  |  1.90<H>    Ca    10.6<H>      17 Jun 2022 15:02    TPro  8.5<H>  /  Alb  4.6  /  TBili  0.7  /  DBili  x   /  AST  28  /  ALT  36  /  AlkPhos  58  06-17      CKMB Units: 2.9 ng/mL (06-17-22 @ 15:02)            LIVER FUNCTIONS - ( 17 Jun 2022 15:02 )  Alb: 4.6 g/dL / Pro: 8.5 g/dL / ALK PHOS: 58 U/L / ALT: 36 U/L / AST: 28 U/L / GGT: x           PT/INR - ( 17 Jun 2022 15:02 )   PT: 12.8 sec;   INR: 1.09 ratio         PTT - ( 17 Jun 2022 15:02 )  PTT:24.7 sec        ======================Micro/Rad/Cardio=================  Telemtry: Reviewed   EKG: Reviewed  CXR: Reviewed  Culture: Reviewed   Echo:   Cath:   ======================================================  PAST MEDICAL & SURGICAL HISTORY:  HTN (hypertension)      DM (diabetes mellitus)      GABRIELE (obstructive sleep apnea)        ====================ASSESSMENT ==============  62 yr old male w/ PMHx of HTN, DM2, GABRIELE presents as transfer from [facility] ED as AWSTEMI s/p PCI to [facility] 6/17.     PLAN:     NEURO:   A&Ox3 no issues    RESPIRATORY:   Hx of GABRIELE   - s/p outpatient w/u ~10yrs ago  - Tonsillectomy "resolved issue", no CPAP @home  - spO2 90s on RA    CV:   AWSTEMI  - s/p C 6/17 w/ NIC x1 pLAD 90%. EDP 25, EF on vgram 40%  - ASA/Brilinta for DAPT, Lipitor 80 for HI Statin   - pt w/ persistent AIVR and runs NSVT, Lopressor 25 BID for BB. Home dose 50mg BID, escalate as tolerated.  - Hold ARB until renal function improved  - f/up TTE  - Trend CE to peak  - remains chest pain free.  - cath arterial access R radial artery, radial band removed per protocol, monitor site    Renal:  DMITRY  - SCr 1.9, unknown baseline, patient reports no h/o renal disease.  - LR 75cc/hr x5 hrs  - Trend BUN/SCr. Strict I/Os  - Keep K 4-4.5, mg > 2  - avoid nephrotoxins. Hold ARB as above.    GI:  No issues  - DASH/CC diet    Endo:  DM2  - f/up A1c. Hold home metformin, start ISS trend finger sticks  - f/up TSH    Heme:  DVT ppx  - SQH  - trend CBC    ID:  No issues  - trend fever curve, WBC. No signs/symptoms to warrant infectious w/up. Monitor off abx.      Patient requires continuous monitoring with bedside rhythm monitoring, pulse ox monitoring, and intermittent blood gas analysis. Care plan discussed with ICU care team. Patient remained critical and at risk for life threatening decompensation.  Patient seen, examined and plan discussed with CCU team during rounds.     I have personally provided 35 minutes of critical care time excluding time spent on separate procedures, in addition to initial critical care time provided by the CICU Attending, Dr. Belcher.    By signing my name below, I, Ana María Bass, attest that this documentation has been prepared under the direction and in the presence of LOUISE Dickens   Electronically signed: Ree Inman, 06-17-22 @ 20:59    I, LOUISE Dickens, personally performed the services described in this documentation. all medical record entries made by the zoeyibe were at my direction and in my presence. I have reviewed the chart and agree that the record reflects my personal performance and is accurate and complete  Electronically signed: LOUISE Dickens        RAYMOND HODGES  MRN-[id number]  Patient is a 62y old  Male who presents with a chief complaint of Anterior Wall STEMI (17 Jun 2022 17:51)    HPI:  62 yr old male w/ PMHx of HTN, DM2, GABRIELE presents to ED at [facility] w/ CP dizziness, EKG showing KEV in anterior leads V2/V3. Pt transferred to [facility] for cath s/p NIC to pLAD 90%. Pt arrived to CICU hemodynamically stable with no formal complaints having significant runs of AIVR. Pt denies palpitations, CP, SOB, dizziness, HA, abd pain, N/V.  (17 Jun 2022 17:51)      Hospital Course:  6/17 AWSTEMI s/p LHC with PCI to LAD    24 HOUR EVENTS:  - LHC: 90% stenosis LAD s/p NIC x1 via RRA  - persistent AIVR, hemodynamically stable    REVIEW OF SYSTEMS:  GENERAL: No fevers, chills, weakness  HEENT: No HA, dizziness  RESPIRATORY: No difficulty breathing, cough, wheezing  CARDIOVASCULAR: See HPI  GASTROINTESTINAL: No abd/epigastric pain. No N/V/D  NEUROLOGIC: No numbness, tingling, paresthesias, local/generalized weakness      ICU Vital Signs Last 24 Hrs  T(C): 37 (17 Jun 2022 19:00), Max: 37.1 (17 Jun 2022 16:55)  T(F): 98.6 (17 Jun 2022 19:00), Max: 98.7 (17 Jun 2022 16:55)  HR: 90 (17 Jun 2022 20:30) (67 - 94)  BP: 120/84 (17 Jun 2022 20:00) (120/84 - 185/89)  BP(mean): 99 (17 Jun 2022 20:00) (99 - 123)  RR: 20 (17 Jun 2022 20:30) (16 - 39)  SpO2: 96% (17 Jun 2022 20:30) (96% - 100%)      I&O's Summary    17 Jun 2022 07:01  -  17 Jun 2022 20:59  --------------------------------------------------------  IN: 0 mL / OUT: 600 mL / NET: -600 mL        CAPILLARY BLOOD GLUCOSE    CAPILLARY BLOOD GLUCOSE      POCT Blood Glucose.: 116 mg/dL (17 Jun 2022 16:57)      PHYSICAL EXAM:  General: Obese middle-aged man, NAD  Neurology/Psychiatric: A&Ox3, nonfocal, LEBLANC x 4. Mood and affect appropriate for situation  Respiratory: Breath sounds CTA in all lung fields b/l. No rhonchi, rales, wheezes  CV: Sinus w/ PVCs/AIVR 85, S1, S2. No murmurs, rubs or gallops. No JVD  Abdominal: Soft, non-tender, non-distended. normoactive BS. No CVAT  Extremities: No peripheral edema, 2+ peripheral pulses in UE/LE b/l, R medial malleolus protruding chronically. R radial band intact no bleeding/hematoma   Skin: No rashes, lesions, ulcers or discoloration    ============================I/O===========================   I&O's Detail    17 Jun 2022 07:01  -  17 Jun 2022 20:59  --------------------------------------------------------  IN:  Total IN: 0 mL    OUT:    Voided (mL): 600 mL  Total OUT: 600 mL    Total NET: -600 mL        ============================ LABS =========================                        14.7   10.57 )-----------( 266      ( 17 Jun 2022 15:02 )             44.4     06-17    137  |  103  |  30<H>  ----------------------------<  133<H>  5.0   |  26  |  1.90<H>    Ca    10.6<H>      17 Jun 2022 15:02    TPro  8.5<H>  /  Alb  4.6  /  TBili  0.7  /  DBili  x   /  AST  28  /  ALT  36  /  AlkPhos  58  06-17      CKMB Units: 2.9 ng/mL (06-17-22 @ 15:02)            LIVER FUNCTIONS - ( 17 Jun 2022 15:02 )  Alb: 4.6 g/dL / Pro: 8.5 g/dL / ALK PHOS: 58 U/L / ALT: 36 U/L / AST: 28 U/L / GGT: x           PT/INR - ( 17 Jun 2022 15:02 )   PT: 12.8 sec;   INR: 1.09 ratio         PTT - ( 17 Jun 2022 15:02 )  PTT:24.7 sec        ======================Micro/Rad/Cardio=================  Telemtry: Reviewed   EKG: Reviewed  CXR: Reviewed  Culture: Reviewed   Echo:   Cath:   ======================================================  PAST MEDICAL & SURGICAL HISTORY:  HTN (hypertension)      DM (diabetes mellitus)      GABRIELE (obstructive sleep apnea)        ====================ASSESSMENT ==============  62 yr old male w/ PMHx of HTN, DM2, GABRIELE presents as transfer from [facility] ED as AWSTEMI s/p PCI to [facility] 6/17.     PLAN:     NEURO:   A&Ox3 no issues    RESPIRATORY:   Hx of GABRIELE   - s/p outpatient w/u ~10yrs ago  - Tonsillectomy "resolved issue", no CPAP @home  - spO2 90s on RA    CV:   AWSTEMI  - s/p C 6/17 w/ NIC x1 pLAD 90%. EDP 25, EF on vgram 40%  - ASA/Brilinta for DAPT, Lipitor 80 for HI Statin   - pt w/ persistent AIVR and runs NSVT,  - Lopressor 25 BID for BB. Home dose 50mg BID, escalate as tolerated.  - Hold ARB until renal function improved  - f/up TTE  - Trend CE to peak  - remains chest pain free.  - cath arterial access R radial artery, radial band removed per protocol, monitor site    Renal:  DMITRY  - SCr 1.9, unknown baseline, patient reports no h/o renal disease.  - LR 75cc/hr x5 hrs  - Trend BUN/SCr. Strict I/Os  - Keep K 4-4.5, mg > 2  - avoid nephrotoxins. Hold ARB as above.    GI:  No issues  - DASH/CC diet    Endo:  DM2  - f/up A1c. Hold home metformin, start ISS trend finger sticks  - f/up TSH    Heme:  DVT ppx  - SQH  - trend CBC    ID:  No issues  - trend fever curve, WBC. No signs/symptoms to warrant infectious w/up. Monitor off abx.      Patient requires continuous monitoring with bedside rhythm monitoring, pulse ox monitoring, and intermittent blood gas analysis. Care plan discussed with ICU care team. Patient remained critical and at risk for life threatening decompensation.  Patient seen, examined and plan discussed with CCU team during rounds.     I have personally provided 35 minutes of critical care time excluding time spent on separate procedures, in addition to initial critical care time provided by the CICU Attending, Dr. Belcher.    By signing my name below, I, Ana María Bass, attest that this documentation has been prepared under the direction and in the presence of LOUISE Dickens   Electronically signed: Ree Inman, 06-17-22 @ 20:59    I, LOUISE Dickens, personally performed the services described in this documentation. all medical record entries made by the scribe were at my direction and in my presence. I have reviewed the chart and agree that the record reflects my personal performance and is accurate and complete  Electronically signed: LOUISE Dickens

## 2022-06-17 NOTE — H&P ADULT - NSHPSOCIALHISTORY_GEN_ALL_CORE
Domiciled in home w/ wife (healthy). Self-employed as contractor. No longer drinks alcohol as of ~5yrs ago, denies any hx of tobacco and/or illicit substance use.

## 2022-06-17 NOTE — H&P ADULT - NS ATTEND AMEND GEN_ALL_CORE FT
61 yo man with DM, HTN p/w STEMI to the OSH s/p PCI to the LAD with NIC    DAPT   high intensity statin  TTE  check lipids, TSH, HbA1C  noted to have AIVR, start BB  elevated creat gentle hydration  DVT pp with SQH

## 2022-06-17 NOTE — ED PROVIDER NOTE - CRITICAL CARE ATTENDING CONTRIBUTION TO CARE
63 yo male with pmhx of HTN DM here for lightheadedness and back pain. 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 Pt states he drank enough water today  pt now with midback pressure  ekg with STEMI,  took 2 asa prior to coming to ED, addl 2 given, cards consulted and pt to be transferred to [facility] for cath  Upon my evaluation, this patient had a high probability of imminent or life-threatening deterioration due to STEMI_________, which required my direct attention, intervention, and personal management.  The patient has a  medical condition that impairs one or more vital organ systems.  Frequent personal assessment and adjustment of medical interventions was performed.      I have personally provided _60__ minutes of critical care time exclusive of time spent on separately billable procedures. Time includes review of laboratory data, radiology results, discussion with consultants, patient and family; monitoring for potential decompensation, as well as time spent retrieving data and reviewing the chart and documenting the visit. Interventions were performed as documented above.

## 2022-06-17 NOTE — H&P ADULT - HISTORY OF PRESENT ILLNESS
62 yr old male w/ PMHx of HTN, DM2  62 yr old male w/ PMHx of HTN, DM2, GABRIELE presents to ED at [facility] w/ CP dizziness, EKG showing KEV in anterior leads V2/V3. Pt transferred to [facility] for cath s/p NIC to pLAD 90%. Pt arrived to CICU hemodynamically stable with no formal complaints having significant runs of AIVR. Pt denies palpitations, CP, SOB, dizziness, HA, abd pain, N/V.  62 yr old male w/ PMHx of HTN, DM2, GABRIELE presents to ED at [facility] w/ CP dizziness, EKG showing KEV in anterior leads V2/V3. Pt transferred to [facility] for cath s/p NIC to pLAD 90%. Pt arrived to CICU hemodynamically stable with no formal complaints but having significant runs of AIVR. Pt denies palpitations, CP, SOB, dizziness, HA, abd pain, N/V.

## 2022-06-18 LAB
A1C WITH ESTIMATED AVERAGE GLUCOSE RESULT: 6.1 % — HIGH (ref 4–5.6)
ALBUMIN SERPL ELPH-MCNC: 4.3 G/DL — SIGNIFICANT CHANGE UP (ref 3.3–5)
ALBUMIN SERPL ELPH-MCNC: 4.3 G/DL — SIGNIFICANT CHANGE UP (ref 3.3–5)
ALP SERPL-CCNC: 56 U/L — SIGNIFICANT CHANGE UP (ref 40–120)
ALP SERPL-CCNC: 58 U/L — SIGNIFICANT CHANGE UP (ref 40–120)
ALT FLD-CCNC: 35 U/L — SIGNIFICANT CHANGE UP (ref 10–45)
ALT FLD-CCNC: 37 U/L — SIGNIFICANT CHANGE UP (ref 10–45)
ANION GAP SERPL CALC-SCNC: 15 MMOL/L — SIGNIFICANT CHANGE UP (ref 5–17)
ANION GAP SERPL CALC-SCNC: 19 MMOL/L — HIGH (ref 5–17)
AST SERPL-CCNC: 130 U/L — HIGH (ref 10–40)
AST SERPL-CCNC: 141 U/L — HIGH (ref 10–40)
BASOPHILS # BLD AUTO: 0.05 K/UL — SIGNIFICANT CHANGE UP (ref 0–0.2)
BASOPHILS # BLD AUTO: 0.05 K/UL — SIGNIFICANT CHANGE UP (ref 0–0.2)
BASOPHILS NFR BLD AUTO: 0.6 % — SIGNIFICANT CHANGE UP (ref 0–2)
BASOPHILS NFR BLD AUTO: 0.6 % — SIGNIFICANT CHANGE UP (ref 0–2)
BILIRUB SERPL-MCNC: 0.5 MG/DL — SIGNIFICANT CHANGE UP (ref 0.2–1.2)
BILIRUB SERPL-MCNC: 0.6 MG/DL — SIGNIFICANT CHANGE UP (ref 0.2–1.2)
BLD GP AB SCN SERPL QL: NEGATIVE — SIGNIFICANT CHANGE UP
BLD GP AB SCN SERPL QL: NEGATIVE — SIGNIFICANT CHANGE UP
BUN SERPL-MCNC: 24 MG/DL — HIGH (ref 7–23)
BUN SERPL-MCNC: 28 MG/DL — HIGH (ref 7–23)
CALCIUM SERPL-MCNC: 9.7 MG/DL — SIGNIFICANT CHANGE UP (ref 8.4–10.5)
CALCIUM SERPL-MCNC: 9.9 MG/DL — SIGNIFICANT CHANGE UP (ref 8.4–10.5)
CHLORIDE SERPL-SCNC: 100 MMOL/L — SIGNIFICANT CHANGE UP (ref 96–108)
CHLORIDE SERPL-SCNC: 99 MMOL/L — SIGNIFICANT CHANGE UP (ref 96–108)
CHOLEST SERPL-MCNC: 152 MG/DL — SIGNIFICANT CHANGE UP
CK MB BLD-MCNC: 12.2 % — HIGH (ref 0–3.5)
CK MB CFR SERPL CALC: 166.6 NG/ML — HIGH (ref 0–6.7)
CK MB CFR SERPL CALC: 220.7 NG/ML — HIGH (ref 0–6.7)
CK SERPL-CCNC: 1690 U/L — HIGH (ref 30–200)
CK SERPL-CCNC: 1809 U/L — HIGH (ref 30–200)
CO2 SERPL-SCNC: 19 MMOL/L — LOW (ref 22–31)
CO2 SERPL-SCNC: 19 MMOL/L — LOW (ref 22–31)
CREAT SERPL-MCNC: 1.22 MG/DL — SIGNIFICANT CHANGE UP (ref 0.5–1.3)
CREAT SERPL-MCNC: 1.42 MG/DL — HIGH (ref 0.5–1.3)
EGFR: 56 ML/MIN/1.73M2 — LOW
EGFR: 67 ML/MIN/1.73M2 — SIGNIFICANT CHANGE UP
EOSINOPHIL # BLD AUTO: 0.04 K/UL — SIGNIFICANT CHANGE UP (ref 0–0.5)
EOSINOPHIL # BLD AUTO: 0.17 K/UL — SIGNIFICANT CHANGE UP (ref 0–0.5)
EOSINOPHIL NFR BLD AUTO: 0.5 % — SIGNIFICANT CHANGE UP (ref 0–6)
EOSINOPHIL NFR BLD AUTO: 2 % — SIGNIFICANT CHANGE UP (ref 0–6)
ESTIMATED AVERAGE GLUCOSE: 128 MG/DL — HIGH (ref 68–114)
GLUCOSE BLDC GLUCOMTR-MCNC: 105 MG/DL — HIGH (ref 70–99)
GLUCOSE BLDC GLUCOMTR-MCNC: 113 MG/DL — HIGH (ref 70–99)
GLUCOSE BLDC GLUCOMTR-MCNC: 124 MG/DL — HIGH (ref 70–99)
GLUCOSE BLDC GLUCOMTR-MCNC: 132 MG/DL — HIGH (ref 70–99)
GLUCOSE SERPL-MCNC: 108 MG/DL — HIGH (ref 70–99)
GLUCOSE SERPL-MCNC: 111 MG/DL — HIGH (ref 70–99)
HCT VFR BLD CALC: 41.8 % — SIGNIFICANT CHANGE UP (ref 39–50)
HCT VFR BLD CALC: 42.7 % — SIGNIFICANT CHANGE UP (ref 39–50)
HCV AB S/CO SERPL IA: 0.15 S/CO — SIGNIFICANT CHANGE UP (ref 0–0.99)
HCV AB SERPL-IMP: SIGNIFICANT CHANGE UP
HDLC SERPL-MCNC: 48 MG/DL — SIGNIFICANT CHANGE UP
HGB BLD-MCNC: 13.8 G/DL — SIGNIFICANT CHANGE UP (ref 13–17)
HGB BLD-MCNC: 13.9 G/DL — SIGNIFICANT CHANGE UP (ref 13–17)
IMM GRANULOCYTES NFR BLD AUTO: 0.5 % — SIGNIFICANT CHANGE UP (ref 0–1.5)
IMM GRANULOCYTES NFR BLD AUTO: 0.5 % — SIGNIFICANT CHANGE UP (ref 0–1.5)
LIPID PNL WITH DIRECT LDL SERPL: 54 MG/DL — SIGNIFICANT CHANGE UP
LYMPHOCYTES # BLD AUTO: 2 K/UL — SIGNIFICANT CHANGE UP (ref 1–3.3)
LYMPHOCYTES # BLD AUTO: 2.3 K/UL — SIGNIFICANT CHANGE UP (ref 1–3.3)
LYMPHOCYTES # BLD AUTO: 23.3 % — SIGNIFICANT CHANGE UP (ref 13–44)
LYMPHOCYTES # BLD AUTO: 27.3 % — SIGNIFICANT CHANGE UP (ref 13–44)
MAGNESIUM SERPL-MCNC: 1.6 MG/DL — SIGNIFICANT CHANGE UP (ref 1.6–2.6)
MAGNESIUM SERPL-MCNC: 2 MG/DL — SIGNIFICANT CHANGE UP (ref 1.6–2.6)
MCHC RBC-ENTMCNC: 28.7 PG — SIGNIFICANT CHANGE UP (ref 27–34)
MCHC RBC-ENTMCNC: 28.8 PG — SIGNIFICANT CHANGE UP (ref 27–34)
MCHC RBC-ENTMCNC: 32.6 GM/DL — SIGNIFICANT CHANGE UP (ref 32–36)
MCHC RBC-ENTMCNC: 33 GM/DL — SIGNIFICANT CHANGE UP (ref 32–36)
MCV RBC AUTO: 87.3 FL — SIGNIFICANT CHANGE UP (ref 80–100)
MCV RBC AUTO: 88.2 FL — SIGNIFICANT CHANGE UP (ref 80–100)
MONOCYTES # BLD AUTO: 0.92 K/UL — HIGH (ref 0–0.9)
MONOCYTES # BLD AUTO: 0.94 K/UL — HIGH (ref 0–0.9)
MONOCYTES NFR BLD AUTO: 10.7 % — SIGNIFICANT CHANGE UP (ref 2–14)
MONOCYTES NFR BLD AUTO: 11.1 % — SIGNIFICANT CHANGE UP (ref 2–14)
NEUTROPHILS # BLD AUTO: 4.94 K/UL — SIGNIFICANT CHANGE UP (ref 1.8–7.4)
NEUTROPHILS # BLD AUTO: 5.54 K/UL — SIGNIFICANT CHANGE UP (ref 1.8–7.4)
NEUTROPHILS NFR BLD AUTO: 58.5 % — SIGNIFICANT CHANGE UP (ref 43–77)
NEUTROPHILS NFR BLD AUTO: 64.4 % — SIGNIFICANT CHANGE UP (ref 43–77)
NON HDL CHOLESTEROL: 104 MG/DL — SIGNIFICANT CHANGE UP
NRBC # BLD: 0 /100 WBCS — SIGNIFICANT CHANGE UP (ref 0–0)
NRBC # BLD: 0 /100 WBCS — SIGNIFICANT CHANGE UP (ref 0–0)
NT-PROBNP SERPL-SCNC: 462 PG/ML — HIGH (ref 0–300)
PHOSPHATE SERPL-MCNC: 3 MG/DL — SIGNIFICANT CHANGE UP (ref 2.5–4.5)
PHOSPHATE SERPL-MCNC: 3.8 MG/DL — SIGNIFICANT CHANGE UP (ref 2.5–4.5)
PLATELET # BLD AUTO: 262 K/UL — SIGNIFICANT CHANGE UP (ref 150–400)
PLATELET # BLD AUTO: 264 K/UL — SIGNIFICANT CHANGE UP (ref 150–400)
POTASSIUM SERPL-MCNC: 3.9 MMOL/L — SIGNIFICANT CHANGE UP (ref 3.5–5.3)
POTASSIUM SERPL-MCNC: 4 MMOL/L — SIGNIFICANT CHANGE UP (ref 3.5–5.3)
POTASSIUM SERPL-SCNC: 3.9 MMOL/L — SIGNIFICANT CHANGE UP (ref 3.5–5.3)
POTASSIUM SERPL-SCNC: 4 MMOL/L — SIGNIFICANT CHANGE UP (ref 3.5–5.3)
PROT SERPL-MCNC: 7.1 G/DL — SIGNIFICANT CHANGE UP (ref 6–8.3)
PROT SERPL-MCNC: 7.2 G/DL — SIGNIFICANT CHANGE UP (ref 6–8.3)
RBC # BLD: 4.79 M/UL — SIGNIFICANT CHANGE UP (ref 4.2–5.8)
RBC # BLD: 4.84 M/UL — SIGNIFICANT CHANGE UP (ref 4.2–5.8)
RBC # FLD: 12.7 % — SIGNIFICANT CHANGE UP (ref 10.3–14.5)
RBC # FLD: 12.9 % — SIGNIFICANT CHANGE UP (ref 10.3–14.5)
RH IG SCN BLD-IMP: POSITIVE — SIGNIFICANT CHANGE UP
RH IG SCN BLD-IMP: POSITIVE — SIGNIFICANT CHANGE UP
SODIUM SERPL-SCNC: 134 MMOL/L — LOW (ref 135–145)
SODIUM SERPL-SCNC: 137 MMOL/L — SIGNIFICANT CHANGE UP (ref 135–145)
TRIGL SERPL-MCNC: 251 MG/DL — HIGH
TROPONIN T, HIGH SENSITIVITY RESULT: 3662 NG/L — HIGH (ref 0–51)
TROPONIN T, HIGH SENSITIVITY RESULT: 4313 NG/L — HIGH (ref 0–51)
TSH SERPL-MCNC: 1.38 UIU/ML — SIGNIFICANT CHANGE UP (ref 0.27–4.2)
WBC # BLD: 8.44 K/UL — SIGNIFICANT CHANGE UP (ref 3.8–10.5)
WBC # BLD: 8.59 K/UL — SIGNIFICANT CHANGE UP (ref 3.8–10.5)
WBC # FLD AUTO: 8.44 K/UL — SIGNIFICANT CHANGE UP (ref 3.8–10.5)
WBC # FLD AUTO: 8.59 K/UL — SIGNIFICANT CHANGE UP (ref 3.8–10.5)

## 2022-06-18 PROCEDURE — 93010 ELECTROCARDIOGRAM REPORT: CPT

## 2022-06-18 PROCEDURE — 99291 CRITICAL CARE FIRST HOUR: CPT

## 2022-06-18 PROCEDURE — 99233 SBSQ HOSP IP/OBS HIGH 50: CPT | Mod: 25

## 2022-06-18 RX ORDER — POTASSIUM CHLORIDE 20 MEQ
20 PACKET (EA) ORAL ONCE
Refills: 0 | Status: COMPLETED | OUTPATIENT
Start: 2022-06-18 | End: 2022-06-18

## 2022-06-18 RX ORDER — MAGNESIUM SULFATE 500 MG/ML
2 VIAL (ML) INJECTION ONCE
Refills: 0 | Status: COMPLETED | OUTPATIENT
Start: 2022-06-18 | End: 2022-06-18

## 2022-06-18 RX ADMIN — ATORVASTATIN CALCIUM 80 MILLIGRAM(S): 80 TABLET, FILM COATED ORAL at 21:04

## 2022-06-18 RX ADMIN — HEPARIN SODIUM 5000 UNIT(S): 5000 INJECTION INTRAVENOUS; SUBCUTANEOUS at 05:35

## 2022-06-18 RX ADMIN — Medication 20 MILLIEQUIVALENT(S): at 06:34

## 2022-06-18 RX ADMIN — Medication 81 MILLIGRAM(S): at 12:23

## 2022-06-18 RX ADMIN — Medication 50 MILLIGRAM(S): at 08:51

## 2022-06-18 RX ADMIN — TICAGRELOR 90 MILLIGRAM(S): 90 TABLET ORAL at 18:35

## 2022-06-18 RX ADMIN — Medication 50 MILLIGRAM(S): at 18:35

## 2022-06-18 RX ADMIN — HEPARIN SODIUM 5000 UNIT(S): 5000 INJECTION INTRAVENOUS; SUBCUTANEOUS at 14:37

## 2022-06-18 RX ADMIN — HEPARIN SODIUM 5000 UNIT(S): 5000 INJECTION INTRAVENOUS; SUBCUTANEOUS at 21:04

## 2022-06-18 RX ADMIN — Medication 25 GRAM(S): at 00:48

## 2022-06-18 RX ADMIN — CHLORHEXIDINE GLUCONATE 1 APPLICATION(S): 213 SOLUTION TOPICAL at 05:33

## 2022-06-18 RX ADMIN — TICAGRELOR 90 MILLIGRAM(S): 90 TABLET ORAL at 05:33

## 2022-06-18 NOTE — PROGRESS NOTE ADULT - SUBJECTIVE AND OBJECTIVE BOX
RAYMOND HODGES  MRN-[id number]  Patient is a 62y old  Male who presents with a chief complaint of Anterior Wall STEMI (18 Jun 2022 08:33)    HPI:  62 yr old male w/ PMHx of HTN, DM2, GABRIELE presents to ED at [facility] w/ CP dizziness, EKG showing KEV in anterior leads V2/V3. Pt transferred to [facility] for cath s/p NIC to pLAD 90%. Pt arrived to CICU hemodynamically stable with no formal complaints but having significant runs of AIVR. Pt denies palpitations, CP, SOB, dizziness, HA, abd pain, N/V.  (17 Jun 2022 17:51)      24 hr events: no acute events    REVIEW OF SYSTEMS:    CONSTITUTIONAL: No weakness, fevers or chills  EYES/ENT: No visual changes;  No vertigo or throat pain   NECK: No pain or stiffness  RESPIRATORY: No cough, wheezing, hemoptysis; No shortness of breath  CARDIOVASCULAR: No chest pain or palpitations  GASTROINTESTINAL: No abdominal or epigastric pain. No nausea, vomiting, or hematemesis; No diarrhea or constipation. No melena or hematochezia.  GENITOURINARY: No dysuria, frequency or hematuria  NEUROLOGICAL: No numbness or weakness  SKIN: No itching, rashes      Physical Exam:  Vital Signs Last 24 Hrs  T(C): 36.9 (18 Jun 2022 19:00), Max: 37 (18 Jun 2022 14:00)  T(F): 98.4 (18 Jun 2022 19:00), Max: 98.6 (18 Jun 2022 14:00)  HR: 65 (18 Jun 2022 19:00) (54 - 93)  BP: 121/84 (18 Jun 2022 19:00) (99/69 - 142/78)  BP(mean): 98 (18 Jun 2022 19:00) (78 - 105)  RR: 32 (18 Jun 2022 19:00) (20 - 61)  SpO2: 96% (18 Jun 2022 19:00) (94% - 99%)    ============================I/O===========================   I&O's Detail    17 Jun 2022 07:01  -  18 Jun 2022 07:00  --------------------------------------------------------  IN:    IV PiggyBack: 50 mL    Lactated Ringers: 375 mL  Total IN: 425 mL    OUT:    Voided (mL): 2775 mL  Total OUT: 2775 mL    Total NET: -2350 mL      18 Jun 2022 07:01  -  18 Jun 2022 19:51  --------------------------------------------------------  IN:    Oral Fluid: 660 mL  Total IN: 660 mL    OUT:    Voided (mL): 1100 mL  Total OUT: 1100 mL    Total NET: -440 mL        ============================ LABS =========================                        13.9   8.44  )-----------( 262      ( 18 Jun 2022 05:42 )             42.7     06-18    134<L>  |  100  |  24<H>  ----------------------------<  111<H>  3.9   |  19<L>  |  1.22    Ca    9.7      18 Jun 2022 05:42  Phos  3.0     06-18  Mg     2.0     06-18    TPro  7.2  /  Alb  4.3  /  TBili  0.6  /  DBili  x   /  AST  141<H>  /  ALT  37  /  AlkPhos  58  06-18    LIVER FUNCTIONS - ( 18 Jun 2022 05:42 )  Alb: 4.3 g/dL / Pro: 7.2 g/dL / ALK PHOS: 58 U/L / ALT: 37 U/L / AST: 141 U/L / GGT: x           PT/INR - ( 17 Jun 2022 15:02 )   PT: 12.8 sec;   INR: 1.09 ratio         PTT - ( 17 Jun 2022 15:02 )  PTT:24.7 sec      ======================Micro/Rad/Cardio=================  Culture: Reviewed   CXR: Reviewed  Echo:Reviewed  ======================================================  PAST MEDICAL & SURGICAL HISTORY:  HTN (hypertension)      DM (diabetes mellitus)      GABRIELE (obstructive sleep apnea)        ====================ASSESSMENT ==============  62 yr old male w/ PMHx of HTN, DM2, GABRIELE presents as transfer from [facility] ED as AWSTEMI s/p PCI to LAD 6/17.     Plan:  ====================== NEUROLOGY=====================  A&Ox3 no issues  - Neuro assessment as per ICU protocol     ==================== RESPIRATORY======================  Hx of GABRIELE   - Denies SOB or dyspnea CTA on exam   - Sao2>92% on room air     ====================CARDIOVASCULAR==================  Admitted w/ AWSTEMI   s/p LHC 6/17 w/ NIC x2 pLAD 90%. EDP 25, EF on vgram 40%  - CE Peaked   - Cont. ASA/Brilinta for DAPT, Lipitor 80 for HI Statin   - Resume Lopressor 50 BID (home dose)  - Hold ARB/ACEI for now pending Cr improvement  - Hydral 10 for hypertension   - TTE: EF 35%, large apical akinesis w/ preserved contractility of basal segment. No LV thrombus.      metoprolol tartrate 50 milliGRAM(s) Oral two times a day    ===================HEMATOLOGIC/ONC ===================  No active issues  - Cont. DVT ppx SQH    aspirin enteric coated 81 milliGRAM(s) Oral daily  heparin   Injectable 5000 Unit(s) SubCutaneous every 8 hours  ticagrelor 90 milliGRAM(s) Oral every 12 hours    ===================== RENAL =========================  DMITRY SCr 1.9 on admission w/ unknown baseline s/p gentle IV Fluid hydration   - Trend BUN/SCr. Strict I/Os  - Keep K 4-4.5, mg > 2  - avoid nephrotoxin meds   - avoid ACE/ARB for now, use hydral for hypertension     ==================== GASTROINTESTINAL===================  No issues  - DASH/CC diet    lactated ringers. 1000 milliLiter(s) (75 mL/Hr) IV Continuous <Continuous>    =======================    ENDOCRINE  =====================  HX OF DM2 (a1C 6.2 on admission)   - ISS     atorvastatin 80 milliGRAM(s) Oral at bedtime  insulin lispro (ADMELOG) corrective regimen sliding scale   SubCutaneous three times a day before meals  insulin lispro (ADMELOG) corrective regimen sliding scale   SubCutaneous at bedtime    ========================INFECTIOUS DISEASE================   No leukocytosis and afebrile   - Trend fever curve, WBC. No signs/symptoms to warrant infectious w/up. Monitor off abx.  - Ambulate as tolerated         Patient requires continuous monitoring with bedside rhythm monitoring, pulse ox monitoring, and intermittent blood gas analysis. Care plan discussed with ICU care team. Patient remained critical and at risk for life threatening decompensation.  Patient seen, examined and plan discussed with CCU team during rounds.     I have personally provided 35 minutes of critical care time excluding time spent on separate procedures, in addition to initial critical care time provided by the CICU Attending, Dr. Belcher   RAYMOND HODGES  MRN-[id number]  Patient is a 62y old  Male who presents with a chief complaint of Anterior Wall STEMI (18 Jun 2022 08:33)    HPI:  62 yr old male w/ PMHx of HTN, DM2, GABRIELE presents to ED at [facility] w/ CP dizziness, EKG showing KEV in anterior leads V2/V3. Pt transferred to [facility] for cath s/p NIC to pLAD 90%. Pt arrived to CICU hemodynamically stable with no formal complaints but having significant runs of AIVR. Pt denies palpitations, CP, SOB, dizziness, HA, abd pain, N/V.  (17 Jun 2022 17:51)      24 hr events: no acute events    REVIEW OF SYSTEMS:    CONSTITUTIONAL: No weakness, fevers or chills  EYES/ENT: No visual changes;  No vertigo or throat pain   NECK: No pain or stiffness  RESPIRATORY: No cough, wheezing, hemoptysis; No shortness of breath  CARDIOVASCULAR: No chest pain or palpitations  GASTROINTESTINAL: No abdominal or epigastric pain. No nausea, vomiting, or hematemesis; No diarrhea or constipation. No melena or hematochezia.  GENITOURINARY: No dysuria, frequency or hematuria  NEUROLOGICAL: No numbness or weakness  SKIN: No itching, rashes      Physical Exam:  Vital Signs Last 24 Hrs  T(C): 36.9 (18 Jun 2022 19:00), Max: 37 (18 Jun 2022 14:00)  T(F): 98.4 (18 Jun 2022 19:00), Max: 98.6 (18 Jun 2022 14:00)  HR: 65 (18 Jun 2022 19:00) (54 - 93)  BP: 121/84 (18 Jun 2022 19:00) (99/69 - 142/78)  BP(mean): 98 (18 Jun 2022 19:00) (78 - 105)  RR: 32 (18 Jun 2022 19:00) (20 - 61)  SpO2: 96% (18 Jun 2022 19:00) (94% - 99%)    ============================I/O===========================   I&O's Detail    17 Jun 2022 07:01  -  18 Jun 2022 07:00  --------------------------------------------------------  IN:    IV PiggyBack: 50 mL    Lactated Ringers: 375 mL  Total IN: 425 mL    OUT:    Voided (mL): 2775 mL  Total OUT: 2775 mL    Total NET: -2350 mL      18 Jun 2022 07:01  -  18 Jun 2022 19:51  --------------------------------------------------------  IN:    Oral Fluid: 660 mL  Total IN: 660 mL    OUT:    Voided (mL): 1100 mL  Total OUT: 1100 mL    Total NET: -440 mL        ============================ LABS =========================                        13.9   8.44  )-----------( 262      ( 18 Jun 2022 05:42 )             42.7     06-18    134<L>  |  100  |  24<H>  ----------------------------<  111<H>  3.9   |  19<L>  |  1.22    Ca    9.7      18 Jun 2022 05:42  Phos  3.0     06-18  Mg     2.0     06-18    TPro  7.2  /  Alb  4.3  /  TBili  0.6  /  DBili  x   /  AST  141<H>  /  ALT  37  /  AlkPhos  58  06-18    LIVER FUNCTIONS - ( 18 Jun 2022 05:42 )  Alb: 4.3 g/dL / Pro: 7.2 g/dL / ALK PHOS: 58 U/L / ALT: 37 U/L / AST: 141 U/L / GGT: x           PT/INR - ( 17 Jun 2022 15:02 )   PT: 12.8 sec;   INR: 1.09 ratio         PTT - ( 17 Jun 2022 15:02 )  PTT:24.7 sec      ======================Micro/Rad/Cardio=================  Culture: Reviewed   CXR: Reviewed  Echo:Reviewed  ======================================================  PAST MEDICAL & SURGICAL HISTORY:  HTN (hypertension)      DM (diabetes mellitus)      GABRIELE (obstructive sleep apnea)        ====================ASSESSMENT ==============  62 yr old male w/ PMHx of HTN, DM2, GABRIELE presents as transfer from [facility] ED as AWSTEMI s/p PCI to LAD 6/17.     Plan:  ====================== NEUROLOGY=====================  A&Ox3 no issues  - Neuro assessment as per ICU protocol     ==================== RESPIRATORY======================  Hx of GABRIELE   - Denies SOB or dyspnea CTA on exam   - Sao2>92% on room air     ====================CARDIOVASCULAR==================  Admitted w/ AWSTEMI   s/p LHC 6/17 w/ NIC x2 pLAD 90%. EDP 25, EF on vgram 40%  - CE Peaked   - Cont. ASA/Brilinta for DAPT, Lipitor 80 for HI Statin   - Resume Lopressor 50 BID (home dose)  - Hold ARB/ACEI for now pending Cr improvement  - TTE: EF 35%, large apical akinesis w/ preserved contractility of basal segment. No LV thrombus.      metoprolol tartrate 50 milliGRAM(s) Oral two times a day    ===================HEMATOLOGIC/ONC ===================  No active issues  - Cont. DVT ppx SQH    aspirin enteric coated 81 milliGRAM(s) Oral daily  heparin   Injectable 5000 Unit(s) SubCutaneous every 8 hours  ticagrelor 90 milliGRAM(s) Oral every 12 hours    ===================== RENAL =========================  DMITRY SCr 1.9 on admission w/ unknown baseline s/p gentle IV Fluid hydration   - Trend BUN/SCr. Strict I/Os  - Keep K 4-4.5, mg > 2  - avoid nephrotoxin meds   - avoid ACE/ARB for now, use hydral for hypertension     ==================== GASTROINTESTINAL===================  No issues  - DASH/CC diet    lactated ringers. 1000 milliLiter(s) (75 mL/Hr) IV Continuous <Continuous>    =======================    ENDOCRINE  =====================  HX OF DM2 (a1C 6.2 on admission)   - ISS     atorvastatin 80 milliGRAM(s) Oral at bedtime  insulin lispro (ADMELOG) corrective regimen sliding scale   SubCutaneous three times a day before meals  insulin lispro (ADMELOG) corrective regimen sliding scale   SubCutaneous at bedtime    ========================INFECTIOUS DISEASE================   No leukocytosis and afebrile   - Trend fever curve, WBC. No signs/symptoms to warrant infectious w/up. Monitor off abx.  - Ambulate as tolerated         Patient requires continuous monitoring with bedside rhythm monitoring, pulse ox monitoring, and intermittent blood gas analysis. Care plan discussed with ICU care team. Patient remained critical and at risk for life threatening decompensation.  Patient seen, examined and plan discussed with CCU team during rounds.     I have personally provided 35 minutes of critical care time excluding time spent on separate procedures, in addition to initial critical care time provided by the CICU Attending, Dr. Belcher

## 2022-06-18 NOTE — CONSULT NOTE ADULT - SUBJECTIVE AND OBJECTIVE BOX
[facility] Consultation    CHIEF COMPLAINT: Patient is a 62y old  Male who presents with a chief complaint of Anterior Wall STEMI (18 Jun 2022 08:33)      HPI:  62 yr old male w/ PMHx of HTN, DM2, GABRIELE [facility] w/ CP dizziness, EKG showing KEV in anterior leads V2/V3. Pt transferred to [facility] for cath s/p NIC to pLAD 90%. Pt arrived to CICU hemodynamically stable with no formal complaints but having significant runs of AIVR. Pt denies palpitations, CP, SOB, dizziness, HA, abd pain, N/V.  (17 Jun 2022 17:51)      PAST MEDICAL & SURGICAL HISTORY:  HTN (hypertension)      DM (diabetes mellitus)      GABRIELE (obstructive sleep apnea)          SOCIAL HISTORY: no tob/etoh    FAMILY HISTORY: FAMILY HISTORY:  FHx: myocardial infarction (Father, Mother)        MEDICATIONS  (STANDING):  aspirin enteric coated 81 milliGRAM(s) Oral daily  atorvastatin 80 milliGRAM(s) Oral at bedtime  chlorhexidine 2% Cloths 1 Application(s) Topical <User Schedule>  heparin   Injectable 5000 Unit(s) SubCutaneous every 8 hours  influenza   Vaccine 0.5 milliLiter(s) IntraMuscular once  insulin lispro (ADMELOG) corrective regimen sliding scale   SubCutaneous three times a day before meals  insulin lispro (ADMELOG) corrective regimen sliding scale   SubCutaneous at bedtime  lactated ringers. 1000 milliLiter(s) (75 mL/Hr) IV Continuous <Continuous>  metoprolol tartrate 50 milliGRAM(s) Oral two times a day  ticagrelor 90 milliGRAM(s) Oral every 12 hours    MEDICATIONS  (PRN):      Allergies    cats (Unknown)  Ceftin (Unknown)    Intolerances        REVIEW OF SYSTEMS:    CONSTITUTIONAL: No weakness, fevers or chills  EYES: No visual changes, No diplopia  ENMT: No throat pain , No exudate  NECK: No pain or stiffness  RESPIRATORY: No cough, wheezing, hemoptysis; No shortness of breath  CARDIOVASCULAR: No chest pain or palpitations  GASTROINTESTINAL: No abdominal pain. No nausea, vomiting, or hematemesis; No diarrhea or constipation. No melena or hematochezia.  GENITOURINARY: No dysuria, frequency or hematuria  NEUROLOGICAL: No numbness or weakness  SKIN: No itching or rash  All other review of systems is negative unless indicated above    VITAL SIGNS:   Vital Signs Last 24 Hrs  T(C): 36.9 (18 Jun 2022 07:00), Max: 37.1 (17 Jun 2022 16:55)  T(F): 98.5 (18 Jun 2022 07:00), Max: 98.7 (17 Jun 2022 16:55)  HR: 59 (18 Jun 2022 13:00) (54 - 94)  BP: 117/70 (18 Jun 2022 13:00) (110/71 - 185/89)  BP(mean): 89 (18 Jun 2022 13:00) (83 - 123)  RR: 20 (18 Jun 2022 13:00) (16 - 61)  SpO2: 96% (18 Jun 2022 13:00) (94% - 100%)    I&O's Summary    17 Jun 2022 07:01  -  18 Jun 2022 07:00  --------------------------------------------------------  IN: 425 mL / OUT: 2775 mL / NET: -2350 mL    18 Jun 2022 07:01  -  18 Jun 2022 14:06  --------------------------------------------------------  IN: 300 mL / OUT: 1100 mL / NET: -800 mL        PHYSICAL EXAM:    Constitutional: NAD, awake and alert, well-developed  Eyes:  EOMI,  Pupils round, no lesions  ENMT: no exudate or erythema  Pulmonary: Non-labored, breath sounds are clear bilaterally, No wheezing, rales or rhonchi  Cardiovascular: PMI  non-displaced Regular S1 and S2, no murmurs, rubs, gallops or clicks  Gastrointestinal: Bowel Sounds present, soft, nontender.   Lymph: No peripheral edema. No cervical lymphadenopathy.  Neurological: Alert, no focal deficits  Skin: No rashes. No cyanosis.  Psych:  Mood & affect appropriate    LABS: All Labs Reviewed:                        13.9   8.44  )-----------( 262      ( 18 Jun 2022 05:42 )             42.7                         13.8   8.59  )-----------( 264      ( 17 Jun 2022 23:40 )             41.8                         14.7   10.57 )-----------( 266      ( 17 Jun 2022 15:02 )             44.4     18 Jun 2022 05:42    134    |  100    |  24     ----------------------------<  111    3.9     |  19     |  1.22   17 Jun 2022 23:40    137    |  99     |  28     ----------------------------<  108    4.0     |  19     |  1.42   17 Jun 2022 15:02    137    |  103    |  30     ----------------------------<  133    5.0     |  26     |  1.90     Ca    9.7        18 Jun 2022 05:42  Ca    9.9        17 Jun 2022 23:40  Ca    10.6       17 Jun 2022 15:02  Phos  3.0       18 Jun 2022 05:42  Phos  3.8       17 Jun 2022 23:40  Mg     2.0       18 Jun 2022 05:42  Mg     1.6       17 Jun 2022 23:40    TPro  7.2    /  Alb  4.3    /  TBili  0.6    /  DBili  x      /  AST  141    /  ALT  37     /  AlkPhos  58     18 Jun 2022 05:42  TPro  7.1    /  Alb  4.3    /  TBili  0.5    /  DBili  x      /  AST  130    /  ALT  35     /  AlkPhos  56     17 Jun 2022 23:40  TPro  8.5    /  Alb  4.6    /  TBili  0.7    /  DBili  x      /  AST  28     /  ALT  36     /  AlkPhos  58     17 Jun 2022 15:02    PT/INR - ( 17 Jun 2022 15:02 )   PT: 12.8 sec;   INR: 1.09 ratio         PTT - ( 17 Jun 2022 15:02 )  PTT:24.7 sec  CARDIAC MARKERS ( 18 Jun 2022 05:42 )  x     / x     / 1690 U/L / x     / 166.6 ng/mL  CARDIAC MARKERS ( 17 Jun 2022 23:40 )  x     / x     / 1809 U/L / x     / 220.7 ng/mL  CARDIAC MARKERS ( 17 Jun 2022 15:02 )  x     / x     / x     / x     / 2.9 ng/mL        06-17 @ 23:40  Pro Bnp 462    06-17 @ 23:40  TSH: 1.38    Troponin T, High Sensitivity (06.18.22 @ 05:42)   Troponin T, High Sensitivity Result: 4313: Specimen not hemolyzed   Troponin T, High Sensitivity Result: 3662: Specimen not hemolyzed     < from: Xray Chest 1 View- PORTABLE-Urgent (Xray Chest 1 View- PORTABLE-Urgent .) (06.17.22 @ 15:42) >    ACC: [id number] EXAM:  XR CHEST PORTABLE URGENT 1V                          PROCEDURE DATE:  06/17/2022          INTERPRETATION:  Portable chest radiograph    CLINICAL INFORMATION: Dizziness    TECHNIQUE:  Portable  AP chest radiograph.    COMPARISON: None. .    FINDINGS:  CATHETERS AND TUBES: None    PULMONARY: The visualized lungs are clear of airspace consolidations or   effusions.   No pneumothorax.    HEART/VASCULAR: The  heart is enlarged in transverse diameter. .    BONES: Visualized osseous structures are intact.    IMPRESSION:   No radiographic evidence of active chest disease.    --- End of Report ---            KARLEY EVANS MD; Attending Radiologist  This document has been electronically signed. Jun 17 2022  7:11PM    < end of copied text >  < from: TTE with Doppler (w/Cont) (06.17.22 @ 18:27) >    Patient name: RAYMOND HODEGS  YOB: 1960   Age: 62 (M)   MR#: [id number]  Study Date: 6/17/2022  [facility]  Study quality: Difficult; Patient scanned sup  Referring Physician: Lauryn Shavre MD  Blood Pressure: 147/83 mmHg  Height: 193 cm  Weight: 134 kg  BSA: 2.6 m2  ------------------------------------------------------------------------  PROCEDURE: Transthoracic echocardiogram with 2-D, M-Mode  and complete spectral and color flow Doppler. Verbal  consent was obtained for injection of  Ultrasonic Enhancing  Agent following a discussion of risks and benefits.  Following intravenous injection of Ultrasonic Enhancing  Agent, harmonic imaging was performed.  INDICATION: ST elevation(STEMI) myocardial infarction of  unspecified site (I21.3)  ------------------------------------------------------------------------  Dimensions:    Normal Values:  LA:            2.0 - 4.0 cm  Ao:     3.5    2.0 - 3.8 cm  SEPTUM:        0.6 - 1.2 cm  PWT:           0.6 - 1.1 cm  LVIDd:         3.0 - 5.6 cm  LVIDs:         1.8 - 4.0 cm  EF (Visual Estimate): 35 %  Doppler Peak Velocity (m/sec): AoV=1.1 TV=2.0  ------------------------------------------------------------------------  Observations:  Mitral Valve: Normal mitral valve.  Aortic Valve/Aorta: Fibrocalcific aortic valve without  stenosis.  Normal aortic root size.  Left Atrium: Normal left atrium.  Left Ventricle: Endocardial visualization enhanced with  intravenous injection of Ultrasonic Enhancing Agent  (Definity).  Normal left ventricular internal dimensions.  Large area of apical akinesis with preserved contractility  in the basal segments.  No left ventricular thrombus is seen.  Impaired LV-relaxation with normal filling pressure.  Right Heart: Normal right atrium. Normal right ventricular  size and function.  Normal tricuspid valve. Mild tricuspid regurgitation.  Normal pulmonic valve.  Pericardium/Pleura: Normal pericardium with no pericardial  effusion.  Hemodynamic:No evidence of pulmonary hypertension.  ------------------------------------------------------------------------  Conclusions:  Endocardial visualization enhanced with intravenous  injection of Ultrasonic Enhancing Agent (Definity).  Normal left ventricular internal dimensions.  Large area of apical akinesis with preserved contractility  in the basal segments.  No left ventricular thrombus is seen.  ------------------------------------------------------------------------  Confirmed on  6/18/2022 - 11:18:11 by Mike Saucedo MD, FASE  ------------------------------------------------------------------------    < end of copied text >

## 2022-06-18 NOTE — CONSULT NOTE ADULT - ASSESSMENT
Fadi appears well status post acute myocardial infarction with primary intervention to the proximal LAD.  He has moderate segmental left ventricular dysfunction although note no evidence of heart failure or dysrhythmias.  He is awake and alert we had an extensive discussion concerning the etiology, evaluation, and management of his disease.    Recommend    Continue dual antiplatelet therapy    Continue CCU monitoring    Continue beta-blocker    Continue statin therapy    Final echo report    Will review cardiac catheterization report    Further management can be dependent on his clinical course

## 2022-06-18 NOTE — PROGRESS NOTE ADULT - SUBJECTIVE AND OBJECTIVE BOX
CICU DAY NOTE  Admission date: 6/17/22  Chief complaint/ Diagnosis  HPI: 62 yr old male w/ PMHx of HTN, DM2, GABRIELE presents [facility] at [facility] w/ CP dizziness, EKG showing KEV in anterior leads V2/V3. Pt transferred to [facility] for cath s/p NIC to pLAD 90%. Pt arrived to CICU hemodynamically stable with no formal complaints but having significant runs of AIVR. Pt denies palpitations, CP, SOB, dizziness, HA, abd pain, N/V.  (17 Jun 2022 17:51)    Interval history: Unevenful overnight    REVIEW OF SYSTEMS  Denies CP, Palpitation, SOB, Dyspnea [ X ] All other systems negative    MEDICATIONS  (STANDING):  aspirin enteric coated 81 milliGRAM(s) Oral daily  atorvastatin 80 milliGRAM(s) Oral at bedtime  chlorhexidine 2% Cloths 1 Application(s) Topical <User Schedule>  heparin   Injectable 5000 Unit(s) SubCutaneous every 8 hours  influenza   Vaccine 0.5 milliLiter(s) IntraMuscular once  insulin lispro (ADMELOG) corrective regimen sliding scale   SubCutaneous three times a day before meals  insulin lispro (ADMELOG) corrective regimen sliding scale   SubCutaneous at bedtime  lactated ringers. 1000 milliLiter(s) (75 mL/Hr) IV Continuous <Continuous>  metoprolol tartrate 50 milliGRAM(s) Oral two times a day  ticagrelor 90 milliGRAM(s) Oral every 12 hours    PAST MEDICAL & SURGICAL HISTORY:  HTN (hypertension)  DM (diabetes mellitus)  GABRIELE (obstructive sleep apnea)    FAMILY HISTORY:  FHx: myocardial infarction (Father, Mother)    Allergy   cats (Unknown)  Ceftin (Unknown)    ICU Vital Signs Last 24 Hrs  T(C): 36.9 (Max: 37.1)  HR: 58 (54 - 94)  BP: 129/76  (110/71 - 185/89)  BP(mean): 97  (83 - 123)  RR: 22    SpO2: 97% (94% - 100%) on room air     I&O's Summary  IN: 425 mL / OUT: 2775 mL / NET: -2350 mL    PHYSICAL EXAM  Appearance: Normal, NAD  HEAD:   Normocephalic  EYES:  PERRLA, conjunctiva and sclera clear  NECK: Supple, No JVD  CHEST/LUNG: Clear to auscultation bilaterally; No wheezing  HEART: Normal S1, S2. No murmurs, rubs, or gallops  ABDOMEN: + Bowel sounds, Soft, NT, ND   EXTREMITIES:  2+ Peripheral Pulses, No clubbing, cyanosis, or edema  NEUROLOGY: non-focal, aaox3  Lymphatic: No lymphadenopathy  SKIN: No rashes or lesions    Interpretation of Telemetry:                          13.9   8.44  )-----------( 262      ( 18 Jun 2022 05:42 )             42.7       134<L>  |  100  |  24<H>  ----------------------------<  111<H>  3.9   |  19<L>  |  1.22    Ca    9.7    Phos  3.0     Mg     2.0     TPro  7.2  /  Alb  4.3  /  TBili  0.6  /  DBili  x   /  AST  141<H>  /  ALT  37  /  AlkPhos  58       CARDIAC MARKERS ( 18 Jun 2022 05:42 )  x     / x     / 1690 U/L / x     / 166.6 ng/mL  CARDIAC MARKERS ( 17 Jun 2022 23:40 )  x     / x     / 1809 U/L / x     / 220.7 ng/mL  CARDIAC MARKERS ( 17 Jun 2022 15:02 )  x     / x     / x     / x     / 2.9 ng/mL            CAPILLARY BLOOD GLUCOSE      POCT Blood Glucose.: 124 mg/dL (18 Jun 2022 08:15)  POCT Blood Glucose.: 130 mg/dL (17 Jun 2022 21:27)  POCT Blood Glucose.: 116 mg/dL (17 Jun 2022 16:57)   CICU DAY NOTE  Admission date: 6/17/22  Chief complaint/ Diagnosis  HPI: 62 yr old male w/ PMHx of HTN, DM2, GABRIELE presents [facility] at [facility] w/ CP dizziness, EKG showing KEV in anterior leads V2/V3. Pt transferred to [facility] for cath s/p NIC to pLAD 90%. Pt arrived to CICU hemodynamically stable with no formal complaints but having significant runs of AIVR. Pt denies palpitations, CP, SOB, dizziness, HA, abd pain, N/V.  (17 Jun 2022 17:51)    Interval history: Uneventful overnight  CE PEAKED  Frequent AIVR overnight Metoprolol started   Pt remains CP free and Hemodynamically stable   DMITRY in setting of STEMI s/p gentle IV fluid now improved     REVIEW OF SYSTEMS  Denies CP, Palpitation, SOB, Dyspnea [ X ] All other systems negative    MEDICATIONS  (STANDING):  aspirin enteric coated 81 milliGRAM(s) Oral daily  atorvastatin 80 milliGRAM(s) Oral at bedtime  chlorhexidine 2% Cloths 1 Application(s) Topical <User Schedule>  heparin   Injectable 5000 Unit(s) SubCutaneous every 8 hours  influenza   Vaccine 0.5 milliLiter(s) IntraMuscular once  insulin lispro (ADMELOG) corrective regimen sliding scale   SubCutaneous three times a day before meals  insulin lispro (ADMELOG) corrective regimen sliding scale   SubCutaneous at bedtime  metoprolol tartrate 50 milliGRAM(s) Oral two times a day  ticagrelor 90 milliGRAM(s) Oral every 12 hours    PAST MEDICAL & SURGICAL HISTORY:  HTN (hypertension)  DM (diabetes mellitus)  GABRIELE (obstructive sleep apnea)    FAMILY HISTORY:  FHx: myocardial infarction (Father, Mother)    Allergy   cats (Unknown)  Ceftin (Unknown)    ICU Vital Signs Last 24 Hrs  T(C): 36.9 (Max: 37.1)  HR: 58 (54 - 94)  BP: 129/76  (110/71 - 185/89)  BP(mean): 97  (83 - 123)  RR: 22    SpO2: 97% (94% - 100%) on room air     I&O's Summary  IN: 425 mL / OUT: 2775 mL / NET: -2350 mL    PHYSICAL EXAM  Appearance: Normal, NAD  HEAD:   Normocephalic  EYES:  PERRLA, conjunctiva and sclera clear  NECK: Supple, No JVD  CHEST/LUNG: Clear to auscultation bilaterally; No wheezing  HEART: Normal S1, S2. No murmurs, rubs, or gallops  ABDOMEN: + Bowel sounds, Soft, NT, ND   EXTREMITIES:  2+ Peripheral Pulses, No clubbing, cyanosis, or edema  NEUROLOGY: non-focal, aaox3  Lymphatic: No lymphadenopathy  SKIN: No rashes or lesions right radial w/o bleeding or hematoma     Interpretation of Telemetry: NSR                         13.9   8.44  )-----------( 262               42.7     134<L>  |  100  |  24<H>  ----------------------------<  111<H>  3.9   |  19<L>  |  ====================ASSESSMENT ==============  62 yr old male w/ PMHx of HTN, DM2, GABRIELE presents as transfer from [facility] ED as AWSTEMI s/p PCI to [facility] 6/17.     PLAN:     NEURO:   A&Ox3 no issues    RESPIRATORY:   Hx of GABRIELE   - s/p outpatient w/u ~10yrs ago  - Tonsillectomy "resolved issue", no CPAP @home  - spO2 90s on RA    CV:   AWSTEMI  - s/p C 6/17 w/ NIC x1 pLAD 90%. EDP 25, EF on vgram 40%  - ASA/Brilinta for DAPT, Lipitor 80 for HI Statin   - pt w/ persistent AIVR and runs NSVT,  - Lopressor 25 BID for BB. Home dose 50mg BID, escalate as tolerated.  - Hold ARB until renal function improved  - f/up TTE  - Trend CE to peak  - remains chest pain free.  - cath arterial access R radial artery, radial band removed per protocol, monitor site    Renal:  DMITRY  - SCr 1.9, unknown baseline, patient reports no h/o renal disease.  - LR 75cc/hr x5 hrs  - Trend BUN/SCr. Strict I/Os  - Keep K 4-4.5, mg > 2  - avoid nephrotoxins. Hold ARB as above.    GI:  No issues  - DASH/CC diet    Endo:  DM2  - f/up A1c. Hold home metformin, start ISS trend finger sticks  - f/up TSH    Heme:  DVT ppx  - SQH  - trend CBC    ID:  No issues  - trend fever curve, WBC. No signs/symptoms to warrant infectious w/up. Monitor off abx.      Ca    9.7    Phos  3.0     Mg     2.0     TPro  7.2  /  Alb  4.3  /  TBili  0.6  /  DBili  x   /  AST  141<H>  /  ALT  37  /  AlkPhos  58   CARDIAC MARKERS ( 18 Jun 2022 05:42 )  x     / x     / 1690 U/L / x     / 166.6 ng/mL  CARDIAC MARKERS ( 17 Jun 2022 23:40 )  x     / x     / 1809 U/L / x     / 220.7 ng/mL  CARDIAC MARKERS ( 17 Jun 2022 15:02 )  x     / x     / x     / x     / 2.9 ng/mL    F/S 116-130     CICU DAY NOTE  Admission date: 6/17/22  Chief complaint/ Diagnosis  HPI: 62 yr old male w/ PMHx of HTN, DM2, GABREILE presents [facility] at [facility] w/ CP dizziness, EKG showing KEV in anterior leads V2/V3. Pt transferred to [facility] for cath s/p NIC to pLAD 90%. Pt arrived to CICU hemodynamically stable with no formal complaints but having significant runs of AIVR. Pt denies palpitations, CP, SOB, dizziness, HA, abd pain, N/V.  (17 Jun 2022 17:51)    Interval history: Uneventful overnight  CE PEAKED  Frequent AIVR overnight Metoprolol started   Pt remains CP free and Hemodynamically stable   DMITRY in setting of STEMI s/p gentle IV fluid now improved     REVIEW OF SYSTEMS  Denies CP, Palpitation, SOB, Dyspnea [ X ] All other systems negative    MEDICATIONS  (STANDING):  aspirin enteric coated 81 milliGRAM(s) Oral daily  atorvastatin 80 milliGRAM(s) Oral at bedtime  chlorhexidine 2% Cloths 1 Application(s) Topical <User Schedule>  heparin   Injectable 5000 Unit(s) SubCutaneous every 8 hours  influenza   Vaccine 0.5 milliLiter(s) IntraMuscular once  insulin lispro (ADMELOG) corrective regimen sliding scale   SubCutaneous three times a day before meals  insulin lispro (ADMELOG) corrective regimen sliding scale   SubCutaneous at bedtime  metoprolol tartrate 50 milliGRAM(s) Oral two times a day  ticagrelor 90 milliGRAM(s) Oral every 12 hours    PAST MEDICAL & SURGICAL HISTORY:  HTN (hypertension)  DM (diabetes mellitus)  GABRIELE (obstructive sleep apnea)    FAMILY HISTORY:  FHx: myocardial infarction (Father, Mother)    Allergy   cats (Unknown)  Ceftin (Unknown)    ICU Vital Signs Last 24 Hrs  T(C): 36.9 (Max: 37.1)  HR: 58 (54 - 94)  BP: 129/76  (110/71 - 185/89)  BP(mean): 97  (83 - 123)  RR: 22    SpO2: 97% (94% - 100%) on room air     I&O's Summary  IN: 425 mL / OUT: 2775 mL / NET: -2350 mL    PHYSICAL EXAM  Appearance: Normal, NAD  HEAD:   Normocephalic  EYES:  EOMI, conjunctiva and sclera clear  NECK: Supple, No JVD  CHEST/LUNG: Clear to auscultation bilaterally; No wheezing  HEART: Normal S1, S2. No murmurs, rubs, or gallops  ABDOMEN: + Bowel sounds, Soft, NT, ND   EXTREMITIES:  2+ Peripheral Pulses, No clubbing, cyanosis, or edema  NEUROLOGY: non-focal, aaox3  Lymphatic: No lymphadenopathy  SKIN: No rashes or lesions right radial w/o bleeding or hematoma     Interpretation of Telemetry: NSR                         13.9   8.44  )-----------( 262               42.7     134<L>  |  100  |  24<H>  ----------------------------<  111<H>  3.9   |  19<L>  |     CICU DAY NOTE  Admission date: 6/17/22  Chief complaint/ Diagnosis  HPI: 62 yr old male w/ PMHx of HTN, DM2, GABRIELE presents [facility] w/ CP dizziness, EKG showing KEV in anterior leads V2/V3. Pt transferred to [facility] for cath s/p NIC to pLAD 90%. Pt arrived to CICU hemodynamically stable with no formal complaints but having significant runs of AIVR. Pt denies palpitations, CP, SOB, dizziness, HA, abd pain, N/V.  (17 Jun 2022 17:51)    Interval history: Uneventful overnight  CE PEAKED  Frequent AIVR overnight Metoprolol started   Pt remains CP free and Hemodynamically stable   DMITRY in setting of STEMI s/p gentle IV fluid now improved     REVIEW OF SYSTEMS  Denies CP, Palpitation, SOB, Dyspnea [ X ] All other systems negative    MEDICATIONS  (STANDING):  aspirin enteric coated 81 milliGRAM(s) Oral daily  atorvastatin 80 milliGRAM(s) Oral at bedtime  chlorhexidine 2% Cloths 1 Application(s) Topical <User Schedule>  heparin   Injectable 5000 Unit(s) SubCutaneous every 8 hours  influenza   Vaccine 0.5 milliLiter(s) IntraMuscular once  insulin lispro (ADMELOG) corrective regimen sliding scale   SubCutaneous three times a day before meals  insulin lispro (ADMELOG) corrective regimen sliding scale   SubCutaneous at bedtime  metoprolol tartrate 50 milliGRAM(s) Oral two times a day  ticagrelor 90 milliGRAM(s) Oral every 12 hours    PAST MEDICAL & SURGICAL HISTORY:  HTN (hypertension)  DM (diabetes mellitus)  GABRIELE (obstructive sleep apnea)    FAMILY HISTORY:  FHx: myocardial infarction (Father, Mother)    Allergy   cats (Unknown)  Ceftin (Unknown)    ICU Vital Signs Last 24 Hrs  T(C): 36.9 (Max: 37.1)  HR: 58 (54 - 94)  BP: 129/76  (110/71 - 185/89)  BP(mean): 97  (83 - 123)  RR: 22    SpO2: 97% (94% - 100%) on room air     I&O's Summary  IN: 425 mL / OUT: 2775 mL / NET: -2350 mL    PHYSICAL EXAM  Appearance: Normal, NAD  HEAD:   Normocephalic  EYES:  EOMI, conjunctiva and sclera clear  NECK: Supple, No JVD  CHEST/LUNG: Clear to auscultation bilaterally; No wheezing  HEART: Normal S1, S2. No murmurs, rubs, or gallops  ABDOMEN: + Bowel sounds, Soft, NT, ND   EXTREMITIES:  2+ Peripheral Pulses, No clubbing, cyanosis, or edema  NEUROLOGY: non-focal, aaox3  Lymphatic: No lymphadenopathy  SKIN: No rashes or lesions right radial w/o bleeding or hematoma     Interpretation of Telemetry: NSR                         13.9   8.44  )-----------( 262               42.7     134<L>  |  100  |  24<H>  ----------------------------<  111<H>  3.9   |  19<L>  |  1.22

## 2022-06-18 NOTE — PROGRESS NOTE ADULT - ASSESSMENT
====================ASSESSMENT ==============  62 yr old male w/ PMHx of HTN, DM2, GABRIELE presents as transfer from [facility] ED as AWSTEMI s/p PCI to [facility] 6/17.     PLAN:   # NEURO:  A&Ox3 no issues  - Neuro assessment as per ICU protocol     # RESPIRATORY:  Hx of GABRIELE   Denies SOB or dyspnea CTA on exam   Sao2>92% on room air     # CARDIOVASCULAR : Admitted w/ AWSTEMI   s/p C 6/17 w/ NIC x1 pLAD 90%. EDP 25, EF on vgram 40%  CE Peaked   - Cont. ASA/Brilinta for DAPT, Lipitor 80 for HI Statin   - Resume Lopressor 50 BID (home dose)  - Hold ARB/ACEI for now and if hypertensive, start hydralazine   - TTE in am     #RENAL:  DMITRY SCr 1.9 on admission w/ unknown baseline s/p gentle IV Fluid hydration   - Trend BUN/SCr. Strict I/Os  - Keep K 4-4.5, mg > 2  - avoid nephrotoxin meds     #GI: No issues  - DASH/CC diet    # Endo: HX OF DM2 (a1C 6.2 on admission)   - ISS     # Heme:  - Cont. DVT ppx SQH    # ID: No leukocytosis and afebrile   - Trend fever curve, WBC. No signs/symptoms to warrant infectious w/up. Monitor off abx.  - Ambulate as tolerated  ====================ASSESSMENT ==============  62 yr old male w/ PMHx of HTN, DM2, GABRIELE presents as transfer from \[facility]\"" ED as AWSTEMI s/p PCI to [facility] 6/17.     PLAN:   # NEURO:  A&Ox3 no issues  - Neuro assessment as per ICU protocol     # RESPIRATORY:  Hx of GABRIELE   Denies SOB or dyspnea CTA on exam   Sao2>92% on room air     # CARDIOVASCULAR : Admitted w/ AWSTEMI   s/p C 6/17 w/ NIC x2 pLAD 90%. EDP 25, EF on vgram 40%  CE Peaked   - Cont. ASA/Brilinta for DAPT, Lipitor 80 for HI Statin   - Resume Lopressor 50 BID (home dose)  - Hold ARB/ACEI for now and if hypertensive, start hydralazine   - TTE in am     #RENAL:  DMITRY SCr 1.9 on admission w/ unknown baseline s/p gentle IV Fluid hydration   - Trend BUN/SCr. Strict I/Os  - Keep K 4-4.5, mg > 2  - avoid nephrotoxin meds     #GI: No issues  - DASH/CC diet    # Endo: HX OF DM2 (a1C 6.2 on admission)   - ISS     # Heme:  - Cont. DVT ppx SQH    # ID: No leukocytosis and afebrile   - Trend fever curve, WBC. No signs/symptoms to warrant infectious w/up. Monitor off abx.  - Ambulate as tolerated

## 2022-06-18 NOTE — PROGRESS NOTE ADULT - NS ATTEND AMEND GEN_ALL_CORE FT
63 yo man with DM, HTN p/w STEMI to the OSH s/p PCI to the LAD with NIC    DAPT   high intensity statin  TTE pending   noted to have AIVR and was started on BB, now improved   elevated creat on admission s/p gentle hydration with improvement, would hold off on starting ACEi/ARB for now and if hypertensive to use other non nephrotoxic agents if necessary.  However, if creat remains WNL would start OMT   H/H stable, DVT pp with SQH  FS no issues   on RA with sats mid 90s

## 2022-06-18 NOTE — CHART NOTE - NSCHARTNOTEFT_GEN_A_CORE
CICU TRANSFER NOTE  Admission date: 6/17/22  Chief complaint/ Diagnosis  HPI: 62 yr old male w/ PMHx of HTN, DM2, GABRIELE presents to ED at [facility] w/ CP dizziness, EKG showing KEV in anterior leads V2/V3. Pt transferred to [facility] for cath s/p NIC to pLAD 90%. Pt arrived to CICU hemodynamically stable with no formal complaints but having significant runs of AIVR. Pt denies palpitations, CP, SOB, dizziness, HA, abd pain, N/V.  (17 Jun 2022 17:51)    Interval history: Uneventful overnight  CE PEAKED  Frequent AIVR overnight Metoprolol started   Pt remains CP free and Hemodynamically stable   DMITRY in setting of STEMI s/p gentle IV fluid now improved     REVIEW OF SYSTEMS  Denies CP, Palpitation, SOB, Dyspnea [ X ] All other systems negative    MEDICATIONS  (STANDING):  aspirin enteric coated 81 milliGRAM(s) Oral daily  atorvastatin 80 milliGRAM(s) Oral at bedtime  chlorhexidine 2% Cloths 1 Application(s) Topical <User Schedule>  heparin   Injectable 5000 Unit(s) SubCutaneous every 8 hours  influenza   Vaccine 0.5 milliLiter(s) IntraMuscular once  insulin lispro (ADMELOG) corrective regimen sliding scale   SubCutaneous three times a day before meals  insulin lispro (ADMELOG) corrective regimen sliding scale   SubCutaneous at bedtime  metoprolol tartrate 50 milliGRAM(s) Oral two times a day  ticagrelor 90 milliGRAM(s) Oral every 12 hours    PAST MEDICAL & SURGICAL HISTORY:  HTN (hypertension)  DM (diabetes mellitus)  GABRIELE (obstructive sleep apnea)    FAMILY HISTORY:  FHx: myocardial infarction (Father, Mother)    Allergy   cats (Unknown)  Ceftin (Unknown)    ICU Vital Signs Last 24 Hrs  T(C): 36.9 (Max: 37.1)  HR: 58 (54 - 94)  BP: 129/76  (110/71 - 185/89)  BP(mean): 97  (83 - 123)  RR: 22    SpO2: 97% (94% - 100%) on room air     I&O's Summary  IN: 425 mL / OUT: 2775 mL / NET: -2350 mL    PHYSICAL EXAM  Appearance: Normal, NAD  HEAD:   Normocephalic  EYES:  EOMI, conjunctiva and sclera clear  NECK: Supple, No JVD  CHEST/LUNG: Clear to auscultation bilaterally; No wheezing  HEART: Normal S1, S2. No murmurs, rubs, or gallops  ABDOMEN: + Bowel sounds, Soft, NT, ND   EXTREMITIES:  2+ Peripheral Pulses, No clubbing, cyanosis, or edema  NEUROLOGY: non-focal, aaox3  Lymphatic: No lymphadenopathy  SKIN: No rashes or lesions right radial w/o bleeding or hematoma     Interpretation of Telemetry: NSR                         13.9   8.44  )-----------( 262               42.7     134<L>  |  100  |  24<H>  ----------------------------<  111<H>  3.9   |  19<L>  |      Assessment and Plan:   · Assessment	  ====================ASSESSMENT ==============  62 yr old male w/ PMHx of HTN, DM2, GABRIELE presents as transfer from [facility] ED as AWSTEMI s/p PCI to [facility] 6/17.     PLAN:   # NEURO:  A&Ox3 no issues  - Neuro assessment as per ICU protocol     # RESPIRATORY:  Hx of GABRIELE   Denies SOB or dyspnea CTA on exam   Sao2>92% on room air     # CARDIOVASCULAR : Admitted w/ AWSTEMI   s/p C 6/17 w/ NIC x1 pLAD 90%. EDP 25, EF on vgram 40%  CE Peaked   TTE (6/17) EF 35% w/ apical akineiss w/ preserved basal segment contractility  - Cont. ASA/Brilinta for DAPT, Lipitor 80 for HI Statin   - Resume Lopressor 50 BID (home dose)  - Hold ARB/ACEI for now and if hypertensive, start hydralazine   - Repeat TTE 6/20 to r/o LV thrombus     #RENAL:  DMITYR SCr 1.9 on admission w/ unknown baseline s/p gentle IV Fluid hydration   - Trend BUN/SCr. Strict I/Os  - Keep K 4-4.5, mg > 2  - avoid nephrotoxin meds     #GI: No issues  - DASH/CC diet    # Endo: HX OF DM2 (a1C 6.2 on admission)   - ISS     # Heme:  - Cont. DVT ppx SQH    # ID: No leukocytosis and afebrile   - Trend fever curve, WBC. No signs/symptoms to warrant infectious w/up. Monitor off abx.  - Ambulate as tolerated CICU TRANSFER NOTE    Accepted under Dr. Lowery, Signed out to [address] [place] [address]     Admission date: 6/17/22  Chief complaint/ Diagnosis  HPI: 62 yr old male w/ PMHx of HTN, DM2, GABRIELE presents to ED at [facility] w/ CP dizziness, EKG showing KEV in anterior leads V2/V3. Pt transferred to [facility] for cath s/p NIC to pLAD 90%. Pt arrived to CICU hemodynamically stable with no formal complaints but having significant runs of AIVR. Pt denies palpitations, CP, SOB, dizziness, HA, abd pain, N/V.  (17 Jun 2022 17:51)    Interval history: Uneventful overnight  CE PEAKED  Frequent AIVR overnight Metoprolol started   Pt remains CP free and Hemodynamically stable   DMITRY in setting of STEMI s/p gentle IV fluid now improved     REVIEW OF SYSTEMS  Denies CP, Palpitation, SOB, Dyspnea [ X ] All other systems negative    MEDICATIONS  (STANDING):  aspirin enteric coated 81 milliGRAM(s) Oral daily  atorvastatin 80 milliGRAM(s) Oral at bedtime  chlorhexidine 2% Cloths 1 Application(s) Topical <User Schedule>  heparin   Injectable 5000 Unit(s) SubCutaneous every 8 hours  influenza   Vaccine 0.5 milliLiter(s) IntraMuscular once  insulin lispro (ADMELOG) corrective regimen sliding scale   SubCutaneous three times a day before meals  insulin lispro (ADMELOG) corrective regimen sliding scale   SubCutaneous at bedtime  metoprolol tartrate 50 milliGRAM(s) Oral two times a day  ticagrelor 90 milliGRAM(s) Oral every 12 hours    PAST MEDICAL & SURGICAL HISTORY:  HTN (hypertension)  DM (diabetes mellitus)  GABRIELE (obstructive sleep apnea)    FAMILY HISTORY:  FHx: myocardial infarction (Father, Mother)    Allergy   cats (Unknown)  Ceftin (Unknown)    ICU Vital Signs Last 24 Hrs  T(C): 36.9 (Max: 37.1)  HR: 58 (54 - 94)  BP: 129/76  (110/71 - 185/89)  BP(mean): 97  (83 - 123)  RR: 22    SpO2: 97% (94% - 100%) on room air     I&O's Summary  IN: 425 mL / OUT: 2775 mL / NET: -2350 mL    PHYSICAL EXAM  Appearance: Normal, NAD  HEAD:   Normocephalic  EYES:  EOMI, conjunctiva and sclera clear  NECK: Supple, No JVD  CHEST/LUNG: Clear to auscultation bilaterally; No wheezing  HEART: Normal S1, S2. No murmurs, rubs, or gallops  ABDOMEN: + Bowel sounds, Soft, NT, ND   EXTREMITIES:  2+ Peripheral Pulses, No clubbing, cyanosis, or edema  NEUROLOGY: non-focal, aaox3  Lymphatic: No lymphadenopathy  SKIN: No rashes or lesions right radial w/o bleeding or hematoma     Interpretation of Telemetry: NSR                         13.9   8.44  )-----------( 262               42.7     134<L>  |  100  |  24<H>  ----------------------------<  111<H>  3.9   |  19<L>  |      Assessment and Plan:   · Assessment	  ====================ASSESSMENT ==============  62 yr old male w/ PMHx of HTN, DM2, GABRIELE presents as transfer from [facility] ED as AWSTEMI s/p PCI to [facility] 6/17.     PLAN:   # NEURO:  A&Ox3 no issues  - Neuro assessment as per ICU protocol     # RESPIRATORY:  Hx of GABRIELE   Denies SOB or dyspnea CTA on exam   Sao2>92% on room air     # CARDIOVASCULAR : Admitted w/ AWSTEMI   s/p [facility] 6/17 w/ NIC x1 pLAD 90%. EDP 25, EF on vgram 40%  CE Peaked   TTE (6/17) EF 35% w/ apical akineiss w/ preserved basal segment contractility  - Cont. ASA/Brilinta for DAPT, Lipitor 80 for HI Statin   - Resume Lopressor 50 BID (home dose)  - Hold ARB/ACEI for now and if hypertensive, start hydralazine   - Repeat TTE 6/20 to r/o LV thrombus     #RENAL:  DMITRY SCr 1.9 on admission w/ unknown baseline s/p gentle IV Fluid hydration   - Trend BUN/SCr. Strict I/Os  - Keep K 4-4.5, mg > 2  - avoid nephrotoxin meds     #GI: No issues  - DASH/CC diet    # Endo: HX OF DM2 (a1C 6.2 on admission)   - ISS     # Heme:  - Cont. DVT ppx SQH    # ID: No leukocytosis and afebrile   - Trend fever curve, WBC. No signs/symptoms to warrant infectious w/up. Monitor off abx.  - Ambulate as tolerated CICU TRANSFER NOTE    Accepted under Dr. Lowery, Signed out to [facility] (Lenny)    Admission date: 6/17/22  Chief complaint/ Diagnosis  HPI: 62 yr old male w/ PMHx of HTN, DM2, GABRIELE presents [facility] ED at [facility] w/ CP dizziness, EKG showing KEV in anterior leads V2/V3. Pt transferred to [facility] for cath s/p NIC to pLAD 90%. Pt arrived to CICU hemodynamically stable with no formal complaints but having significant runs of AIVR. Pt denies palpitations, CP, SOB, dizziness, HA, abd pain, N/V.  (17 Jun 2022 17:51)    Interval history: Uneventful overnight  CE PEAKED  Frequent AIVR overnight Metoprolol started   Pt remains CP free and Hemodynamically stable   DMITRY in setting of STEMI s/p gentle IV fluid now improved     REVIEW OF SYSTEMS  Denies CP, Palpitation, SOB, Dyspnea [ X ] All other systems negative    MEDICATIONS  (STANDING):  aspirin enteric coated 81 milliGRAM(s) Oral daily  atorvastatin 80 milliGRAM(s) Oral at bedtime  chlorhexidine 2% Cloths 1 Application(s) Topical <User Schedule>  heparin   Injectable 5000 Unit(s) SubCutaneous every 8 hours  influenza   Vaccine 0.5 milliLiter(s) IntraMuscular once  insulin lispro (ADMELOG) corrective regimen sliding scale   SubCutaneous three times a day before meals  insulin lispro (ADMELOG) corrective regimen sliding scale   SubCutaneous at bedtime  metoprolol tartrate 50 milliGRAM(s) Oral two times a day  ticagrelor 90 milliGRAM(s) Oral every 12 hours    PAST MEDICAL & SURGICAL HISTORY:  HTN (hypertension)  DM (diabetes mellitus)  GABRIELE (obstructive sleep apnea)    FAMILY HISTORY:  FHx: myocardial infarction (Father, Mother)    Allergy   cats (Unknown)  Ceftin (Unknown)    ICU Vital Signs Last 24 Hrs  T(C): 36.9 (Max: 37.1)  HR: 58 (54 - 94)  BP: 129/76  (110/71 - 185/89)  BP(mean): 97  (83 - 123)  RR: 22    SpO2: 97% (94% - 100%) on room air     I&O's Summary  IN: 425 mL / OUT: 2775 mL / NET: -2350 mL    PHYSICAL EXAM  Appearance: Normal, NAD  HEAD:   Normocephalic  EYES:  EOMI, conjunctiva and sclera clear  NECK: Supple, No JVD  CHEST/LUNG: Clear to auscultation bilaterally; No wheezing  HEART: Normal S1, S2. No murmurs, rubs, or gallops  ABDOMEN: + Bowel sounds, Soft, NT, ND   EXTREMITIES:  2+ Peripheral Pulses, No clubbing, cyanosis, or edema  NEUROLOGY: non-focal, aaox3  Lymphatic: No lymphadenopathy  SKIN: No rashes or lesions right radial w/o bleeding or hematoma     Interpretation of Telemetry: NSR                         13.9   8.44  )-----------( 262               42.7     134<L>  |  100  |  24<H>  ----------------------------<  111<H>  3.9   |  19<L>  |      Assessment and Plan:   · Assessment	  ====================ASSESSMENT ==============  62 yr old male w/ PMHx of HTN, DM2, GABRIELE presents as transfer from \[facility]\"" ED as AWSTEMI s/p PCI to [facility] 6/17.     PLAN:   # NEURO:  A&Ox3 no issues  - Neuro assessment as per ICU protocol     # RESPIRATORY:  Hx of GABRIELE   Denies SOB or dyspnea CTA on exam   Sao2>92% on room air     # CARDIOVASCULAR : Admitted w/ AWSTEMI   s/p [facility] 6/17 w/ NIC x1 pLAD 90%. EDP 25, EF on vgram 40%  CE Peaked   TTE (6/17) EF 35% w/ apical akineiss w/ preserved basal segment contractility  - Cont. ASA/Brilinta for DAPT, Lipitor 80 for HI Statin   - Resume Lopressor 50 BID (home dose)  - Hold ARB/ACEI for now and if hypertensive, start hydralazine   - Repeat TTE 6/20 to r/o LV thrombus     #RENAL:  DMITRY SCr 1.9 on admission w/ unknown baseline s/p gentle IV Fluid hydration   - Trend BUN/SCr. Strict I/Os  - Keep K 4-4.5, mg > 2  - avoid nephrotoxin meds     #GI: No issues  - DASH/CC diet    # Endo: HX OF DM2 (a1C 6.2 on admission)   - ISS     # Heme:  - Cont. DVT ppx SQH    # ID: No leukocytosis and afebrile   - Trend fever curve, WBC. No signs/symptoms to warrant infectious w/up. Monitor off abx.  - Ambulate as tolerated

## 2022-06-19 ENCOUNTER — TRANSCRIPTION ENCOUNTER (OUTPATIENT)
Age: 62
End: 2022-06-19

## 2022-06-19 VITALS
RESPIRATION RATE: 18 BRPM | DIASTOLIC BLOOD PRESSURE: 86 MMHG | TEMPERATURE: 98 F | HEART RATE: 62 BPM | OXYGEN SATURATION: 97 % | SYSTOLIC BLOOD PRESSURE: 138 MMHG

## 2022-06-19 LAB
ALBUMIN SERPL ELPH-MCNC: 4.4 G/DL — SIGNIFICANT CHANGE UP (ref 3.3–5)
ALP SERPL-CCNC: 63 U/L — SIGNIFICANT CHANGE UP (ref 40–120)
ALT FLD-CCNC: 36 U/L — SIGNIFICANT CHANGE UP (ref 10–45)
ANION GAP SERPL CALC-SCNC: 15 MMOL/L — SIGNIFICANT CHANGE UP (ref 5–17)
AST SERPL-CCNC: 95 U/L — HIGH (ref 10–40)
BILIRUB SERPL-MCNC: 0.7 MG/DL — SIGNIFICANT CHANGE UP (ref 0.2–1.2)
BUN SERPL-MCNC: 21 MG/DL — SIGNIFICANT CHANGE UP (ref 7–23)
CALCIUM SERPL-MCNC: 9.4 MG/DL — SIGNIFICANT CHANGE UP (ref 8.4–10.5)
CHLORIDE SERPL-SCNC: 101 MMOL/L — SIGNIFICANT CHANGE UP (ref 96–108)
CO2 SERPL-SCNC: 19 MMOL/L — LOW (ref 22–31)
CREAT SERPL-MCNC: 1.07 MG/DL — SIGNIFICANT CHANGE UP (ref 0.5–1.3)
EGFR: 78 ML/MIN/1.73M2 — SIGNIFICANT CHANGE UP
GLUCOSE BLDC GLUCOMTR-MCNC: 108 MG/DL — HIGH (ref 70–99)
GLUCOSE BLDC GLUCOMTR-MCNC: 126 MG/DL — HIGH (ref 70–99)
GLUCOSE BLDC GLUCOMTR-MCNC: 134 MG/DL — HIGH (ref 70–99)
GLUCOSE SERPL-MCNC: 122 MG/DL — HIGH (ref 70–99)
HCT VFR BLD CALC: 44.4 % — SIGNIFICANT CHANGE UP (ref 39–50)
HGB BLD-MCNC: 14.6 G/DL — SIGNIFICANT CHANGE UP (ref 13–17)
MAGNESIUM SERPL-MCNC: 2 MG/DL — SIGNIFICANT CHANGE UP (ref 1.6–2.6)
MCHC RBC-ENTMCNC: 28.6 PG — SIGNIFICANT CHANGE UP (ref 27–34)
MCHC RBC-ENTMCNC: 32.9 GM/DL — SIGNIFICANT CHANGE UP (ref 32–36)
MCV RBC AUTO: 87.1 FL — SIGNIFICANT CHANGE UP (ref 80–100)
NRBC # BLD: 0 /100 WBCS — SIGNIFICANT CHANGE UP (ref 0–0)
PHOSPHATE SERPL-MCNC: 2.4 MG/DL — LOW (ref 2.5–4.5)
PLATELET # BLD AUTO: 263 K/UL — SIGNIFICANT CHANGE UP (ref 150–400)
POTASSIUM SERPL-MCNC: 4.1 MMOL/L — SIGNIFICANT CHANGE UP (ref 3.5–5.3)
POTASSIUM SERPL-SCNC: 4.1 MMOL/L — SIGNIFICANT CHANGE UP (ref 3.5–5.3)
PROT SERPL-MCNC: 7.5 G/DL — SIGNIFICANT CHANGE UP (ref 6–8.3)
RBC # BLD: 5.1 M/UL — SIGNIFICANT CHANGE UP (ref 4.2–5.8)
RBC # FLD: 13 % — SIGNIFICANT CHANGE UP (ref 10.3–14.5)
SODIUM SERPL-SCNC: 135 MMOL/L — SIGNIFICANT CHANGE UP (ref 135–145)
WBC # BLD: 7.27 K/UL — SIGNIFICANT CHANGE UP (ref 3.8–10.5)
WBC # FLD AUTO: 7.27 K/UL — SIGNIFICANT CHANGE UP (ref 3.8–10.5)

## 2022-06-19 PROCEDURE — 84443 ASSAY THYROID STIM HORMONE: CPT

## 2022-06-19 PROCEDURE — C1887: CPT

## 2022-06-19 PROCEDURE — 86900 BLOOD TYPING SEROLOGIC ABO: CPT

## 2022-06-19 PROCEDURE — 82962 GLUCOSE BLOOD TEST: CPT

## 2022-06-19 PROCEDURE — 84484 ASSAY OF TROPONIN QUANT: CPT

## 2022-06-19 PROCEDURE — 80053 COMPREHEN METABOLIC PANEL: CPT

## 2022-06-19 PROCEDURE — 83735 ASSAY OF MAGNESIUM: CPT

## 2022-06-19 PROCEDURE — 86850 RBC ANTIBODY SCREEN: CPT

## 2022-06-19 PROCEDURE — 84100 ASSAY OF PHOSPHORUS: CPT

## 2022-06-19 PROCEDURE — C1874: CPT

## 2022-06-19 PROCEDURE — 80061 LIPID PANEL: CPT

## 2022-06-19 PROCEDURE — 86803 HEPATITIS C AB TEST: CPT

## 2022-06-19 PROCEDURE — 85025 COMPLETE CBC W/AUTO DIFF WBC: CPT

## 2022-06-19 PROCEDURE — 93458 L HRT ARTERY/VENTRICLE ANGIO: CPT | Mod: 59

## 2022-06-19 PROCEDURE — C1753: CPT

## 2022-06-19 PROCEDURE — 99153 MOD SED SAME PHYS/QHP EA: CPT

## 2022-06-19 PROCEDURE — C9606: CPT | Mod: LD

## 2022-06-19 PROCEDURE — 83036 HEMOGLOBIN GLYCOSYLATED A1C: CPT

## 2022-06-19 PROCEDURE — 82550 ASSAY OF CK (CPK): CPT

## 2022-06-19 PROCEDURE — 86901 BLOOD TYPING SEROLOGIC RH(D): CPT

## 2022-06-19 PROCEDURE — 92978 ENDOLUMINL IVUS OCT C 1ST: CPT | Mod: LD

## 2022-06-19 PROCEDURE — C1769: CPT

## 2022-06-19 PROCEDURE — 93005 ELECTROCARDIOGRAM TRACING: CPT

## 2022-06-19 PROCEDURE — C1725: CPT

## 2022-06-19 PROCEDURE — C1894: CPT

## 2022-06-19 PROCEDURE — C8929: CPT

## 2022-06-19 PROCEDURE — 36415 COLL VENOUS BLD VENIPUNCTURE: CPT

## 2022-06-19 PROCEDURE — 99152 MOD SED SAME PHYS/QHP 5/>YRS: CPT

## 2022-06-19 PROCEDURE — 99232 SBSQ HOSP IP/OBS MODERATE 35: CPT

## 2022-06-19 PROCEDURE — 82553 CREATINE MB FRACTION: CPT

## 2022-06-19 PROCEDURE — 83880 ASSAY OF NATRIURETIC PEPTIDE: CPT

## 2022-06-19 RX ORDER — TICAGRELOR 90 MG/1
1 TABLET ORAL
Qty: 60 | Refills: 0
Start: 2022-06-19 | End: 2022-07-18

## 2022-06-19 RX ORDER — ASPIRIN/CALCIUM CARB/MAGNESIUM 324 MG
1 TABLET ORAL
Qty: 30 | Refills: 0
Start: 2022-06-19 | End: 2022-07-18

## 2022-06-19 RX ORDER — ATORVASTATIN CALCIUM 80 MG/1
1 TABLET, FILM COATED ORAL
Qty: 0 | Refills: 0 | DISCHARGE

## 2022-06-19 RX ORDER — ATORVASTATIN CALCIUM 80 MG/1
1 TABLET, FILM COATED ORAL
Qty: 30 | Refills: 0
Start: 2022-06-19 | End: 2022-07-18

## 2022-06-19 RX ORDER — CLOPIDOGREL BISULFATE 75 MG/1
1 TABLET, FILM COATED ORAL
Qty: 30 | Refills: 0
Start: 2022-06-19 | End: 2022-07-18

## 2022-06-19 RX ORDER — CLOPIDOGREL BISULFATE 75 MG/1
75 TABLET, FILM COATED ORAL DAILY
Refills: 0 | Status: DISCONTINUED | OUTPATIENT
Start: 2022-06-20 | End: 2022-06-19

## 2022-06-19 RX ORDER — SODIUM,POTASSIUM PHOSPHATES 278-250MG
1 POWDER IN PACKET (EA) ORAL
Refills: 0 | Status: COMPLETED | OUTPATIENT
Start: 2022-06-19 | End: 2022-06-19

## 2022-06-19 RX ORDER — CLOPIDOGREL BISULFATE 75 MG/1
300 TABLET, FILM COATED ORAL ONCE
Refills: 0 | Status: COMPLETED | OUTPATIENT
Start: 2022-06-19 | End: 2022-06-19

## 2022-06-19 RX ORDER — LOSARTAN POTASSIUM 100 MG/1
1 TABLET, FILM COATED ORAL
Qty: 0 | Refills: 0 | DISCHARGE

## 2022-06-19 RX ADMIN — TICAGRELOR 90 MILLIGRAM(S): 90 TABLET ORAL at 05:15

## 2022-06-19 RX ADMIN — Medication 1 TABLET(S): at 12:36

## 2022-06-19 RX ADMIN — HEPARIN SODIUM 5000 UNIT(S): 5000 INJECTION INTRAVENOUS; SUBCUTANEOUS at 13:48

## 2022-06-19 RX ADMIN — CLOPIDOGREL BISULFATE 300 MILLIGRAM(S): 75 TABLET, FILM COATED ORAL at 18:13

## 2022-06-19 RX ADMIN — Medication 1 TABLET(S): at 17:23

## 2022-06-19 RX ADMIN — Medication 50 MILLIGRAM(S): at 05:15

## 2022-06-19 RX ADMIN — Medication 50 MILLIGRAM(S): at 17:23

## 2022-06-19 RX ADMIN — HEPARIN SODIUM 5000 UNIT(S): 5000 INJECTION INTRAVENOUS; SUBCUTANEOUS at 05:15

## 2022-06-19 RX ADMIN — Medication 81 MILLIGRAM(S): at 12:31

## 2022-06-19 NOTE — PROVIDER CONTACT NOTE (OTHER) - ASSESSMENT
Patient is A&O x 4, denies chest pain, SOB, palpitations, dizziness. VS: Temp 98.2, HR 55, /80, RR 18, O2 sat 97% on room air.

## 2022-06-19 NOTE — PROGRESS NOTE ADULT - ASSESSMENT
Fadi appears well status post acute myocardial infarction with primary intervention to the proximal LAD.  He has moderate segmental left ventricular dysfunction although note no evidence of heart failure or dysrhythmias.  He is awake and alert we had an extensive discussion concerning the etiology, evaluation, and management of his disease.    Recommend    Continue dual antiplatelet therapy    D/C planning    Continue beta-blocker    Continue statin therapy    Final echo report    Will review cardiac catheterization report    Further management can be dependent on his clinical course

## 2022-06-19 NOTE — DISCHARGE NOTE PROVIDER - NSDCCPCAREPLAN_GEN_ALL_CORE_FT
PRINCIPAL DISCHARGE DIAGNOSIS  Diagnosis: STEMI (ST elevation myocardial infarction)  Assessment and Plan of Treatment: S/p [facility] 6/17 w/ NIC x1 pLAD 90%. EDP 25, EF on vgram 40%  - pt w/ persistent AIVR and runs NSVT, Lopressor 25 BID for BB. Home dose 50mg BID, escalate as tolerated.  - Hold ARB until renal function improved  - post PCI echocardiogram EF 35%, Endocardial visualization enhanced with intravenous injection of Ultrasonic Enhancing Agent (Definity). Normal left ventricular internal dimensions. Large area of apical akinesis with preserved contractility in the basal segments. No left ventricular thrombus is seen.   Please continue ASA/Brilinta for DAPT, Lipitor 80 for HI Statin   - Resume Lopressor 50 BID (home dose)  - Hold ARB/ACEI for now and if hypertensive, start hydralazine   - TTE in am      SECONDARY DISCHARGE DIAGNOSES  Diagnosis: DMITRY (acute kidney injury)  Assessment and Plan of Treatment:      PRINCIPAL DISCHARGE DIAGNOSIS  Diagnosis: STEMI (ST elevation myocardial infarction)  Assessment and Plan of Treatment: S/p [facility] 6/17 w/ NIC x1 pLAD 90%. EDP 25, EF on vgram 40%  Echo on 6/17-EF 35%, Endocardial visualization enhanced with intravenous injection of Ultrasonic Enhancing Agent (Definity). Normal left ventricular internal dimensions. Large area of apical akinesis with preserved contractility in the basal segments. No left ventricular thrombus is seen.  Please continue ASA/Plavix for DAPT, Lipitor 80 for HI Statin   Please continue Lopressor 50 BID (home dose)  Please follow up with your cardiologist in one week   Please follow up with your primray care physician in one week         SECONDARY DISCHARGE DIAGNOSES  Diagnosis: DMITRY (acute kidney injury)  Assessment and Plan of Treatment: Losartan and HCTZ was on hold  Now renal function improved   Cr 1.07 today   Need close monitoring of Cr  Please follow up with your primray care physician in one week    Diagnosis: NSVT (nonsustained ventricular tachycardia)  Assessment and Plan of Treatment: Continue Metoprolol as directed    Diagnosis: DM2 (diabetes mellitus, type 2)  Assessment and Plan of Treatment: HgA1C this admission 6.1  Make sure you get your HgA1c checked every three months.  Resume Metformin tomorrow on 6/20   If you take oral diabetes medications, check your blood glucose two times a day.  If you take insulin, check your blood glucose before meals and at bedtime.  It's important not to skip any meals.  Keep a log of your blood glucose results and always take it with you to your doctor appointments.  Keep a list of your current medications including injectables and over the counter medications and bring this medication list with you to all your doctor appointments.  If you have not seen your ophthalmologist this year call for appointment.  Check your feet daily for redness, sores, or openings. Do not self treat. If no improvement in two days call your primary care physician for an appointment.  Low blood sugar (hypoglycemia) is a blood sugar below 70mg/dl. Check your blood sugar if you feel signs/symptoms of hypoglycemia. If your blood sugar is below 70 take 15 grams of carbohydrates (ex 4 oz of apple juice, 3-4 glucose tablets, or 4-6 oz of regular soda) wait 15 minutes and repeat blood sugar to make sure it comes up above 70.  If your blood sugar is above 70 and you are due for a meal, have a meal.  If you are not due for a meal have a snack.  This snack helps keeps your blood sugar at a safe range.    Diagnosis: Transaminitis  Assessment and Plan of Treatment: Liver enzymes monitored  and trending down     PRINCIPAL DISCHARGE DIAGNOSIS  Diagnosis: STEMI (ST elevation myocardial infarction)  Assessment and Plan of Treatment: S/p [facility] 6/17 w/ NIC x1 pLAD 90%. EDP 25, EF on vgram 40%  Echo on 6/17-EF 35%, Endocardial visualization enhanced with intravenous injection of Ultrasonic Enhancing Agent (Definity). Normal left ventricular internal dimensions. Large area of apical akinesis with preserved contractility in the basal segments. No left ventricular thrombus is seen.  Please continue ASA/Plavix for DAPT, Lipitor 80 for HI Statin   Please continue Lopressor 50 BID (home dose)  Please follow up with your cardiologist in one week   Please follow up with your primray care physician in one week         SECONDARY DISCHARGE DIAGNOSES  Diagnosis: DMITRY (acute kidney injury)  Assessment and Plan of Treatment: Losartan and HCTZ was on hold  Now renal function improved   Cr 1.07 today   Continue to hold Losartan and HCTZ, follow up with your cardiologist to resume medications  Need close monitoring of Cr  Please follow up with your primray care physician in one week    Diagnosis: NSVT (nonsustained ventricular tachycardia)  Assessment and Plan of Treatment: Continue Metoprolol as directed    Diagnosis: DM2 (diabetes mellitus, type 2)  Assessment and Plan of Treatment: HgA1C this admission 6.1  Make sure you get your HgA1c checked every three months.  Resume Metformin tomorrow on 6/20   If you take oral diabetes medications, check your blood glucose two times a day.  If you take insulin, check your blood glucose before meals and at bedtime.  It's important not to skip any meals.  Keep a log of your blood glucose results and always take it with you to your doctor appointments.  Keep a list of your current medications including injectables and over the counter medications and bring this medication list with you to all your doctor appointments.  If you have not seen your ophthalmologist this year call for appointment.  Check your feet daily for redness, sores, or openings. Do not self treat. If no improvement in two days call your primary care physician for an appointment.  Low blood sugar (hypoglycemia) is a blood sugar below 70mg/dl. Check your blood sugar if you feel signs/symptoms of hypoglycemia. If your blood sugar is below 70 take 15 grams of carbohydrates (ex 4 oz of apple juice, 3-4 glucose tablets, or 4-6 oz of regular soda) wait 15 minutes and repeat blood sugar to make sure it comes up above 70.  If your blood sugar is above 70 and you are due for a meal, have a meal.  If you are not due for a meal have a snack.  This snack helps keeps your blood sugar at a safe range.    Diagnosis: Transaminitis  Assessment and Plan of Treatment: Liver enzymes monitored  and trending down  AST 95 today   Please follow up with your primray care physician to monitor liver enzymes

## 2022-06-19 NOTE — CHART NOTE - NSCHARTNOTEFT_GEN_A_CORE
Spoke with cardiology Dr. Ferguson  - as per Dr. eFrguson, Patient is cleared for discharge in cardiology standpoint, may switch Brillinta to Plavix ( Brillinta is not covered by insurance, loading dose can be given todayX1 then 75 mg daily. )  - No need for repeat Echocardiogram inpatient, can be done as outpatient   - To follow up with Dr. Ferguson  in one week   - Plavix loading dose discussed with pharmacy, recommended Plavix 300mg PO X1.    Eulalia Mcnamara NP-C  #[id number]

## 2022-06-19 NOTE — DISCHARGE NOTE PROVIDER - NSDCMRMEDTOKEN_GEN_ALL_CORE_FT
atorvastatin 40 mg oral tablet: 1 tab(s) orally once a day  hydroCHLOROthiazide 25 mg oral tablet: 1 tab(s) orally once a day  losartan 50 mg oral tablet: 1 tab(s) orally once a day  metFORMIN 500 mg oral tablet: 1 tab(s) orally 2 times a day  metoprolol tartrate 50 mg oral tablet: 1 tab(s) orally 2 times a day   aspirin 81 mg oral delayed release tablet: 1 tab(s) orally once a day  atorvastatin 80 mg oral tablet: 1 tab(s) orally once a day (at bedtime)  hydroCHLOROthiazide 25 mg oral tablet: 1 tab(s) orally once a day  losartan 50 mg oral tablet: 1 tab(s) orally once a day  metFORMIN 500 mg oral tablet: 1 tab(s) orally 2 times a day  metoprolol tartrate 50 mg oral tablet: 1 tab(s) orally 2 times a day  Plavix 75 mg oral tablet: 1 tab(s) orally once a day    aspirin 81 mg oral delayed release tablet: 1 tab(s) orally once a day  atorvastatin 80 mg oral tablet: 1 tab(s) orally once a day (at bedtime)  metFORMIN 500 mg oral tablet: 1 tab(s) orally 2 times a day  metoprolol tartrate 50 mg oral tablet: 1 tab(s) orally 2 times a day  Plavix 75 mg oral tablet: 1 tab(s) orally once a day

## 2022-06-19 NOTE — DISCHARGE NOTE PROVIDER - HOSPITAL COURSE
62 yr old male w/ PMHx of HTN, DM2, GABRIELE presents as transfer from [facility] ED as AWSTEMI s/p PCI to LAD 6/17.   S/p [facility] 6/17 w/ NIC x1 pLAD 90%. EDP 25, EF on vgram 40%  - ASA/Brilinta for DAPT, Lipitor 80 for HI Statin   - pt w/ persistent AIVR and runs NSVT, Lopressor 25 BID for BB. Home dose 50mg BID, escalate as tolerated.  - Hold ARB until renal function improved  - post PCI echocardiogram EF 35%, Endocardial visualization enhanced with intravenous injection of Ultrasonic Enhancing Agent (Definity). Normal left ventricular internal dimensions. Large area of apical akinesis with preserved contractility in the basal segments. No left ventricular thrombus is seen.    - Cont. ASA/Brilinta for DAPT, Lipitor 80 for HI Statin   - Resume Lopressor 50 BID (home dose)  - Hold ARB/ACEI for now and if hypertensive, start hydralazine   - TTE in am     - Trend CE to peak  - remains chest pain free.  - cath arterial access R radial artery, radial band removed per protocol, monitor site      RESPIRATORY:   Hx of GABRIELE   - s/p outpatient w/u ~10yrs ago  - Tonsillectomy "resolved issue", no CPAP @home  - spO2 90s on RA    CV:   AWSTEMI  - s/p [facility] 6/17 w/ NIC x1 pLAD 90%. EDP 25, EF on vgram 40%  - ASA/Brilinta for DAPT, Lipitor 80 for HI Statin   - pt w/ persistent AIVR and runs NSVT, Lopressor 25 BID for BB. Home dose 50mg BID, escalate as tolerated.  - Hold ARB until renal function improved  - f/up TTE  - Trend CE to peak  - remains chest pain free.  - cath arterial access R radial artery, radial band removed per protocol, monitor site    Renal:  DMITRY  - SCr 1.9, unknown baseline, patient reports no h/o renal disease.  - LR 75cc/hr x5 hrs  - Trend BUN/SCr. Strict I/Os  - Keep K 4-4.5, mg > 2  - avoid nephrotoxins. Hold ARB as above.    GI:  No issues  - DASH/CC diet    Endo:  DM2  - f/up A1c. Hold home metformin, start ISS trend finger sticks  - f/up TSH    Heme:  DVT ppx  - SQH  - trend CBC    ID:  No issues  - trend fever curve, WBC. No signs/symptoms to warrant infectious w/up. Monitor off abx.       Attestation Statements:  Attending and PA/NP shared services statement (NON-critical care):     Attending to bill.     I attest my time as attending is greater than 50% of the total combined time spent on qualifying patient care activities by the PA/NP and attending.     I have made amendments to the documentation where necessary. Additional comments: 63 yo man with DM, HTN p/w STEMI to the OSH s/p PCI to the LAD with NIC    DAPT   high intensity statin  TTE  check lipids, TSH, HbA1C  noted to have AIVR, start BB  elevated creat gentle hydration  DVT pp with SQH.    62 yr old male w/ PMHx of HTN, DM2, GABRIELE presents as transfer from [facility] ED as AWSTEMI s/p PCI to LAD 6/17.     S/p [facility] 6/17 w/ NIC x1 pLAD 90%. EDP 25, EF on vgram 40%  - ASA/Brilinta for DAPT, Lipitor 80 for HI Statin.   - pt w/ persistent AIVR and runs NSVT, Lopressor 25 BID for BB. Home dose 50mg BID  - ARB was on hold until renal function improved  - post PCI echocardiogram EF 35%, Endocardial visualization enhanced with intravenous injection of Ultrasonic Enhancing Agent (Definity). Normal left ventricular internal dimensions. Large area of apical akinesis with preserved contractility in the basal segments. No left ventricular thrombus is seen.     Hx of GABRIELE   - s/p outpatient w/u ~10yrs ago  - Tonsillectomy "resolved issue", no CPAP @home  - spO2 90s on RA    DMITRY  - SCr 1.9, unknown baseline, patient reports no h/o renal disease.  - LR 75cc/hr x5 hrs  - Trend BUN/SCr. Strict I/Os  - ARB was on hold, now creatinine improved to 1.07    DM ( A1C 6.1 )    Patient to follow up with PCP and cardiology in one week

## 2022-06-19 NOTE — DISCHARGE NOTE PROVIDER - CARE PROVIDER_API CALL
Bryce Ferguson)  Cardiovascular Disease  [address] [place] [address]  Phone: [phone]  Fax: [phone]  Follow Up Time: 1 week

## 2022-06-19 NOTE — PROGRESS NOTE ADULT - SUBJECTIVE AND OBJECTIVE BOX
Follow up: MI    HPI:  62 yr old male w/ PMHx of HTN, DM2, GABRIELE presents to ED at [facility] w/ CP dizziness, EKG showing KEV in anterior leads V2/V3. Pt transferred to [facility] for cath s/p NIC to pLAD 90%. Pt arrived to CICU hemodynamically stable with no formal complaints but having significant runs of AIVR. Pt denies palpitations, CP, SOB, dizziness, HA, abd pain, N/V.  (17 Jun 2022 17:51)    He is feeling well status post percutaneous intervention.  He has no chest pain, dyspnea, palpitations, or lightheadedness.  He is ambulating without difficulty      PAST MEDICAL & SURGICAL HISTORY:  HTN (hypertension)      DM (diabetes mellitus)      GABRIELE (obstructive sleep apnea)          MEDICATIONS  (STANDING):  aspirin enteric coated 81 milliGRAM(s) Oral daily  atorvastatin 80 milliGRAM(s) Oral at bedtime  heparin   Injectable 5000 Unit(s) SubCutaneous every 8 hours  insulin lispro (ADMELOG) corrective regimen sliding scale   SubCutaneous three times a day before meals  insulin lispro (ADMELOG) corrective regimen sliding scale   SubCutaneous at bedtime  metoprolol tartrate 50 milliGRAM(s) Oral two times a day    Vital Signs Last 24 Hrs  T(C): 36.6 (19 Jun 2022 12:00), Max: 37 (18 Jun 2022 23:00)  T(F): 97.9 (19 Jun 2022 12:00), Max: 98.6 (18 Jun 2022 23:00)  HR: 62 (19 Jun 2022 12:00) (55 - 70)  BP: 138/86 (19 Jun 2022 12:00) (114/71 - 138/86)  BP(mean): 87 (19 Jun 2022 00:00) (87 - 101)  RR: 18 (19 Jun 2022 12:00) (18 - 24)  SpO2: 97% (19 Jun 2022 12:00) (95% - 97%)    I&O's Summary    18 Jun 2022 07:01  -  19 Jun 2022 07:00  --------------------------------------------------------  IN: 660 mL / OUT: 1100 mL / NET: -440 mL    19 Jun 2022 07:01  -  19 Jun 2022 20:12  --------------------------------------------------------  IN: 840 mL / OUT: 0 mL / NET: 840 mL        PHYSICAL EXAM:    Constitutional: NAD, awake and alert, well-developed  Eyes:  EOMI,  Pupils round, no lesions  ENMT: no exudate or erythema  Pulmonary:  breath sounds are clear bilaterally, No wheezing, rales or rhonchi  Cardiovascular: PMI not palpable RRR normal S1 and S2, no murmurs, rubs, gallops or clicks  Gastrointestinal: Bowel Sounds present, soft, nontender.   Lymph: No cervical lymphadenopathy.  Neurological: Alert, no focal deficits  Skin: No rashes.  No cyanosis.  Psych:  Mood & affect appropriate   Ext: No lower ext edema      CARDIAC MARKERS ( 18 Jun 2022 05:42 )  x     / x     / 1690 U/L / x     / 166.6 ng/mL  CARDIAC MARKERS ( 17 Jun 2022 23:40 )  x     / x     / 1809 U/L / x     / 220.7 ng/mL                            14.6   7.27  )-----------( 263      ( 19 Jun 2022 07:31 )             44.4     06-19    135  |  101  |  21  ----------------------------<  122<H>  4.1   |  19<L>  |  1.07    Ca    9.4      19 Jun 2022 07:35  Phos  2.4     06-19  Mg     2.0     06-19    TPro  7.5  /  Alb  4.4  /  TBili  0.7  /  DBili  x   /  AST  95<H>  /  ALT  36  /  AlkPhos  63  06-19    < from: TTE with Doppler (w/Cont) (06.17.22 @ 18:27) >    Patient name: RAYMOND HODGES  YOB: 1960   Age: 62 (M)   MR#: [id number]  Study Date: 6/17/2022  [facility]  Study quality: Difficult; Patient scanned sup  Referring Physician: Lauryn Shaver MD  Blood Pressure: 147/83 mmHg  Height: 193 cm  Weight: 134 kg  BSA: 2.6 m2  ------------------------------------------------------------------------  PROCEDURE: Transthoracic echocardiogram with 2-D, M-Mode  and complete spectral and color flow Doppler. Verbal  consent was obtained for injection of  Ultrasonic Enhancing  Agent following a discussion of risks and benefits.  Following intravenous injection of Ultrasonic Enhancing  Agent, harmonic imaging was performed.  INDICATION: ST elevation(STEMI) myocardial infarction of  unspecified site (I21.3)  ------------------------------------------------------------------------  Dimensions:    Normal Values:  LA:            2.0 - 4.0 cm  Ao:     3.5    2.0 - 3.8 cm  SEPTUM:        0.6 - 1.2 cm  PWT:           0.6 - 1.1 cm  LVIDd:         3.0 - 5.6 cm  LVIDs:         1.8 - 4.0 cm  EF (Visual Estimate): 35 %  Doppler Peak Velocity (m/sec): AoV=1.1 TV=2.0  ------------------------------------------------------------------------  Observations:  Mitral Valve: Normal mitral valve.  Aortic Valve/Aorta: Fibrocalcific aortic valve without  stenosis.  Normal aortic root size.  Left Atrium: Normal left atrium.  Left Ventricle: Endocardial visualization enhanced with  intravenous injection of Ultrasonic Enhancing Agent  (Definity).  Normal left ventricular internal dimensions.  Large area of apical akinesis with preserved contractility  in the basal segments.  No left ventricular thrombus is seen.  Impaired LV-relaxation with normal filling pressure.  Right Heart: Normal right atrium. Normal right ventricular  size and function.  Normal tricuspid valve. Mild tricuspid regurgitation.  Normal pulmonic valve.  Pericardium/Pleura: Normal pericardium with no pericardial  effusion.  Hemodynamic:No evidence of pulmonary hypertension.  ------------------------------------------------------------------------  Conclusions:  Endocardial visualization enhanced with intravenous  injection of Ultrasonic Enhancing Agent (Definity).  Normal left ventricular internal dimensions.  Large area of apical akinesis with preserved contractility  in the basal segments.  No left ventricular thrombus is seen.  ------------------------------------------------------------------------  Confirmed on  6/18/2022 - 11:18:11 by Mike Saucedo MD, FASE  ------------------------------------------------------------------------    < end of copied text >

## 2022-06-19 NOTE — CHART NOTE - NSCHARTNOTEFT_GEN_A_CORE
CC: WCT    Notified by RN that patient had 7 beats WCT.  Patient transferred from CICU overnight for AWSTEMI s/p NIC.  Patient resting in bed comfortably, NAD, asymptomatic.  Will check BMP, Mag, Phos and give Lopressor 50mg now.  Will continue to monitor on telemetry.  Cardiology following, recs appreciated.  Will endorse to AM team, Attending to follow.    Lenny Finley PA-C  Dept. of Medicine  #[phone]

## 2022-06-19 NOTE — DISCHARGE NOTE NURSING/CASE MANAGEMENT/SOCIAL WORK - NSDCPEFALRISK_GEN_ALL_CORE
For information on Fall & Injury Prevention, visit: [email or web address] OR  [email or web address] OR  https://www.cdc.gov/steadi/patient.html

## 2022-06-21 PROBLEM — Z00.00 ENCOUNTER FOR PREVENTIVE HEALTH EXAMINATION: Status: ACTIVE | Noted: 2022-06-21

## 2022-06-21 PROBLEM — G47.33 OBSTRUCTIVE SLEEP APNEA (ADULT) (PEDIATRIC): Chronic | Status: ACTIVE | Noted: 2022-06-17

## 2022-06-21 PROBLEM — I10 ESSENTIAL (PRIMARY) HYPERTENSION: Chronic | Status: ACTIVE | Noted: 2022-06-17

## 2022-06-21 PROBLEM — E11.9 TYPE 2 DIABETES MELLITUS WITHOUT COMPLICATIONS: Chronic | Status: ACTIVE | Noted: 2022-06-17

## 2022-06-28 ENCOUNTER — NON-APPOINTMENT (OUTPATIENT)
Age: 62
End: 2022-06-28

## 2022-06-28 ENCOUNTER — APPOINTMENT (OUTPATIENT)
Dept: CARDIOLOGY | Facility: CLINIC | Age: 62
End: 2022-06-28

## 2022-06-28 VITALS
BODY MASS INDEX: 35.68 KG/M2 | WEIGHT: 293 LBS | SYSTOLIC BLOOD PRESSURE: 146 MMHG | DIASTOLIC BLOOD PRESSURE: 83 MMHG | HEIGHT: 76 IN | HEART RATE: 56 BPM | OXYGEN SATURATION: 100 %

## 2022-06-28 DIAGNOSIS — I25.10 ATHEROSCLEROTIC HEART DISEASE OF NATIVE CORONARY ARTERY W/OUT ANGINA PECTORIS: ICD-10-CM

## 2022-06-28 DIAGNOSIS — I10 ESSENTIAL (PRIMARY) HYPERTENSION: ICD-10-CM

## 2022-06-28 DIAGNOSIS — Z78.9 OTHER SPECIFIED HEALTH STATUS: ICD-10-CM

## 2022-06-28 DIAGNOSIS — Z82.49 FAMILY HISTORY OF ISCHEMIC HEART DISEASE AND OTHER DISEASES OF THE CIRCULATORY SYSTEM: ICD-10-CM

## 2022-06-28 DIAGNOSIS — I21.9 ACUTE MYOCARDIAL INFARCTION, UNSPECIFIED: ICD-10-CM

## 2022-06-28 DIAGNOSIS — E11.9 TYPE 2 DIABETES MELLITUS W/OUT COMPLICATIONS: ICD-10-CM

## 2022-06-28 DIAGNOSIS — G47.33 OBSTRUCTIVE SLEEP APNEA (ADULT) (PEDIATRIC): ICD-10-CM

## 2022-06-28 PROCEDURE — 99215 OFFICE O/P EST HI 40 MIN: CPT

## 2022-06-28 PROCEDURE — 93000 ELECTROCARDIOGRAM COMPLETE: CPT

## 2022-07-01 ENCOUNTER — RESULT CHARGE (OUTPATIENT)
Age: 62
End: 2022-07-01

## 2022-07-02 PROBLEM — I21.9 MYOCARDIAL INFARCTION: Status: ACTIVE | Noted: 2022-07-02

## 2022-07-02 PROBLEM — G47.33 OSA (OBSTRUCTIVE SLEEP APNEA): Status: ACTIVE | Noted: 2022-07-02

## 2022-07-02 PROBLEM — Z82.49 FAMILY HISTORY OF ACUTE MYOCARDIAL INFARCTION: Status: ACTIVE | Noted: 2022-07-02

## 2022-07-02 PROBLEM — I10 BENIGN ESSENTIAL HTN: Status: ACTIVE | Noted: 2022-07-02

## 2022-07-02 PROBLEM — E11.9 DIABETES MELLITUS: Status: ACTIVE | Noted: 2022-07-02

## 2022-07-02 PROBLEM — I25.10 CAD (CORONARY ARTERY DISEASE): Status: ACTIVE | Noted: 2022-07-02

## 2022-07-02 PROBLEM — Z78.9 DOES NOT USE TOBACCO: Status: ACTIVE | Noted: 2022-07-02

## 2022-07-02 RX ORDER — ASPIRIN ENTERIC COATED TABLETS 81 MG 81 MG/1
81 TABLET, DELAYED RELEASE ORAL
Qty: 90 | Refills: 3 | Status: ACTIVE | COMMUNITY
Start: 2022-07-02

## 2022-07-02 RX ORDER — CLOPIDOGREL BISULFATE 75 MG/1
75 TABLET, FILM COATED ORAL
Qty: 90 | Refills: 3 | Status: ACTIVE | COMMUNITY
Start: 2022-07-02

## 2022-07-02 RX ORDER — ATORVASTATIN CALCIUM 80 MG/1
80 TABLET, FILM COATED ORAL DAILY
Qty: 90 | Refills: 3 | Status: ACTIVE | COMMUNITY
Start: 2022-07-02

## 2022-07-02 RX ORDER — METFORMIN HYDROCHLORIDE 500 MG/1
500 TABLET, COATED ORAL
Qty: 180 | Refills: 3 | Status: ACTIVE | COMMUNITY
Start: 2022-07-02

## 2022-07-02 RX ORDER — METOPROLOL TARTRATE 50 MG/1
50 TABLET, FILM COATED ORAL TWICE DAILY
Qty: 30 | Refills: 3 | Status: ACTIVE | COMMUNITY
Start: 2022-07-02

## 2022-07-02 NOTE — CARDIOLOGY SUMMARY
[de-identified] : 6/28/22: SR, IWMI ANASTAISA, AWMI ANASTASIA [de-identified] : EF 35-40% [de-identified] : 6/22: Total distal LAD occlusion s/p PCI

## 2022-07-02 NOTE — REVIEW OF SYSTEMS
[SOB] : no shortness of breath [Leg Claudication] : no intermittent leg claudication [Dyspnea on exertion] : not dyspnea during exertion [Palpitations] : no palpitations [Syncope] : no syncope [Cough] : no cough [Wheezing] : no wheezing [Negative] : Psychiatric

## 2022-07-02 NOTE — DISCUSSION/SUMMARY
[FreeTextEntry1] : Fadi appears well after his coronary intervention for acute myocardial infarction.  He has moderate left ventricular dysfunction but no evidence for heart failure, ischemia, or dysrhythmias.  We had an extensive discussion concerning his history, medications, and risk factor reduction.  He will continue his current medical regimen and we will check a lipid profile in the near future.  He will repeat his echocardiography examination within the next several months.  Counseling on all his issues represented greater than 50% of his visit which was 45 minutes in duration

## 2022-07-02 NOTE — HISTORY OF PRESENT ILLNESS
[FreeTextEntry1] : Fadi presents for evaluation after his recent hospitalization for myocardial infarction.  He developed the acute onset of chest discomfort and went to [facility].  He had an ST segment elevation anterior wall myocardial infarction.  Urgent cardiac catheterization revealed total occlusion with thrombus of the distal LAD for which he underwent angioplasty and stenting.  He was found to have an ejection fraction of 35 to 40% and had an uncomplicated course.  He is feeling better and reports no chest pain, dyspnea, or palpitations\par \par Past medical history is remarkable for hypertension, diabetes, obstructive sleep apnea, coronary artery disease, myocardial infarction

## 2022-07-02 NOTE — PHYSICAL EXAM
[5th Left ICS - MCL] : palpated at the 5th LICS in the midclavicular line [Apical Thrill] : no thrill palpable at the apex [No Precordial Heave] : no precordial heave was noted [Normal Rate] : normal [Heart Rate ___] : [unfilled] bpm [Rhythm Regular] : regular [Normal S1] : normal S1 [Normal S2] : normal S2 [No Gallop] : no gallop heard [S3] : no S3 [S4] : no S4 [Click] : no click [Pericardial Rub] : no pericardial rub [No Murmur] : no murmurs heard [No Pitting Edema] : no pitting edema present [Rt] : no varicose veins of the right leg [Lt] : no varicose veins of the left leg [Right Carotid Bruit] : no bruit heard over the right carotid [Left Carotid Bruit] : no bruit heard over the left carotid [Left Femoral Bruit] : no bruit heard over the left femoral artery [Right Femoral Bruit] : no bruit heard over the right femoral artery [2+] : left 2+ [No Abnormalities] : the abdominal aorta was not enlarged and no bruit was heard [Bruit] : no bruit heard [Normal] : moves all extremities, no focal deficits, normal speech [de-identified] : normal

## 2022-12-28 ENCOUNTER — APPOINTMENT (OUTPATIENT)
Dept: CARDIOLOGY | Facility: CLINIC | Age: 62
End: 2022-12-28

## 2024-08-15 NOTE — ED PROVIDER NOTE - CCCP TRG CHIEF CMPLNT
Pt. Remains stable while on a BIPAP and remains alert but non verbal. Neuro has spoken to family and has agreed to have MRI completed. Provided report to receiving RN and no questions or concerns present. Transport, RN and respiratory therapist present to assist to NCCU.   back pain & light headed/pain, neck

## 2024-08-28 NOTE — ED PROVIDER NOTE - CARDIAC, MLM
Try calling pt for scheduling but no answer. Left message and a call back number   Normal rate, regular rhythm.  Heart sounds S1, S2.